# Patient Record
Sex: FEMALE | Race: WHITE | NOT HISPANIC OR LATINO | Employment: FULL TIME | URBAN - METROPOLITAN AREA
[De-identification: names, ages, dates, MRNs, and addresses within clinical notes are randomized per-mention and may not be internally consistent; named-entity substitution may affect disease eponyms.]

---

## 2017-08-25 ENCOUNTER — COMMUNICATION - HEALTHEAST (OUTPATIENT)
Dept: SCHEDULING | Facility: CLINIC | Age: 22
End: 2017-08-25

## 2017-10-26 ENCOUNTER — OFFICE VISIT - HEALTHEAST (OUTPATIENT)
Dept: FAMILY MEDICINE | Facility: CLINIC | Age: 22
End: 2017-10-26

## 2017-10-26 DIAGNOSIS — Z23 NEED FOR PROPHYLACTIC VACCINATION WITH TETANUS-DIPHTHERIA (TD): ICD-10-CM

## 2017-10-26 DIAGNOSIS — L98.8 SKIN MACULE: ICD-10-CM

## 2017-10-26 ASSESSMENT — MIFFLIN-ST. JEOR: SCORE: 1432.15

## 2017-12-12 ENCOUNTER — AMBULATORY - HEALTHEAST (OUTPATIENT)
Dept: FAMILY MEDICINE | Facility: CLINIC | Age: 22
End: 2017-12-12

## 2017-12-21 ENCOUNTER — RECORDS - HEALTHEAST (OUTPATIENT)
Dept: ADMINISTRATIVE | Facility: OTHER | Age: 22
End: 2017-12-21

## 2017-12-27 ENCOUNTER — RECORDS - HEALTHEAST (OUTPATIENT)
Dept: ADMINISTRATIVE | Facility: OTHER | Age: 22
End: 2017-12-27

## 2017-12-27 LAB
LAB AP CHARGES (HE HISTORICAL CONVERSION): NORMAL
PATH REPORT.COMMENTS IMP SPEC: NORMAL
PATH REPORT.COMMENTS IMP SPEC: NORMAL
PATH REPORT.FINAL DX SPEC: NORMAL
PATH REPORT.GROSS SPEC: NORMAL
PATH REPORT.MICROSCOPIC SPEC OTHER STN: NORMAL
PATH REPORT.RELEVANT HX SPEC: NORMAL
RESULT FLAG (HE HISTORICAL CONVERSION): NORMAL

## 2018-03-29 ASSESSMENT — MIFFLIN-ST. JEOR: SCORE: 1432.15

## 2018-03-30 ENCOUNTER — ANESTHESIA - HEALTHEAST (OUTPATIENT)
Dept: SURGERY | Facility: CLINIC | Age: 23
End: 2018-03-30

## 2018-03-30 ENCOUNTER — SURGERY - HEALTHEAST (OUTPATIENT)
Dept: SURGERY | Facility: CLINIC | Age: 23
End: 2018-03-30

## 2018-03-30 ASSESSMENT — MIFFLIN-ST. JEOR: SCORE: 1436.23

## 2018-05-17 ENCOUNTER — OFFICE VISIT - HEALTHEAST (OUTPATIENT)
Dept: ALLERGY | Facility: CLINIC | Age: 23
End: 2018-05-17

## 2018-05-17 DIAGNOSIS — J45.30 MILD PERSISTENT ASTHMA WITHOUT COMPLICATION: ICD-10-CM

## 2018-05-17 DIAGNOSIS — J30.9 CHRONIC ALLERGIC RHINITIS, UNSPECIFIED SEASONALITY, UNSPECIFIED TRIGGER: ICD-10-CM

## 2018-05-17 ASSESSMENT — MIFFLIN-ST. JEOR: SCORE: 1432.15

## 2018-11-15 ENCOUNTER — OFFICE VISIT - HEALTHEAST (OUTPATIENT)
Dept: ALLERGY | Facility: CLINIC | Age: 23
End: 2018-11-15

## 2018-11-15 DIAGNOSIS — J30.9 ALLERGIC RHINITIS, UNSPECIFIED SEASONALITY, UNSPECIFIED TRIGGER: ICD-10-CM

## 2018-11-15 DIAGNOSIS — J45.30 MILD PERSISTENT ASTHMA WITHOUT COMPLICATION: ICD-10-CM

## 2018-11-15 ASSESSMENT — MIFFLIN-ST. JEOR: SCORE: 1432.15

## 2019-01-14 ENCOUNTER — COMMUNICATION - HEALTHEAST (OUTPATIENT)
Dept: ALLERGY | Facility: CLINIC | Age: 24
End: 2019-01-14

## 2019-01-14 DIAGNOSIS — J45.30 MILD PERSISTENT ASTHMA WITHOUT COMPLICATION: ICD-10-CM

## 2019-01-30 ENCOUNTER — COMMUNICATION - HEALTHEAST (OUTPATIENT)
Dept: ALLERGY | Facility: CLINIC | Age: 24
End: 2019-01-30

## 2019-04-02 ENCOUNTER — OFFICE VISIT - HEALTHEAST (OUTPATIENT)
Dept: FAMILY MEDICINE | Facility: CLINIC | Age: 24
End: 2019-04-02

## 2019-04-02 DIAGNOSIS — Z33.1 CURRENT PREGNANCY DETERMINED BY HISTORY: ICD-10-CM

## 2019-04-02 DIAGNOSIS — Z86.2 HISTORY OF BLOOD DISORDER: ICD-10-CM

## 2019-04-03 ENCOUNTER — AMBULATORY - HEALTHEAST (OUTPATIENT)
Dept: MATERNAL FETAL MEDICINE | Facility: HOSPITAL | Age: 24
End: 2019-04-03

## 2019-04-03 DIAGNOSIS — O26.90 PREGNANCY, ANTEPARTUM, COMPLICATIONS: ICD-10-CM

## 2019-04-05 ENCOUNTER — OFFICE VISIT - HEALTHEAST (OUTPATIENT)
Dept: MATERNAL FETAL MEDICINE | Facility: HOSPITAL | Age: 24
End: 2019-04-05

## 2019-04-05 DIAGNOSIS — Z84.81 FAMILY HISTORY OF GENETIC DISEASE CARRIER: ICD-10-CM

## 2019-04-05 DIAGNOSIS — O26.90 PREGNANCY, ANTEPARTUM, COMPLICATIONS: ICD-10-CM

## 2019-04-05 DIAGNOSIS — Z83.2 FAMILY HISTORY OF BLEEDING OR CLOTTING DISORDER: ICD-10-CM

## 2019-04-09 ENCOUNTER — COMMUNICATION - HEALTHEAST (OUTPATIENT)
Dept: MATERNAL FETAL MEDICINE | Facility: HOSPITAL | Age: 24
End: 2019-04-09

## 2019-04-11 DIAGNOSIS — Z83.2 FAMILY HISTORY OF BLEEDING OR CLOTTING DISORDER: Primary | ICD-10-CM

## 2019-05-06 ENCOUNTER — OFFICE VISIT (OUTPATIENT)
Dept: HEMATOLOGY | Facility: CLINIC | Age: 24
End: 2019-05-06
Attending: INTERNAL MEDICINE
Payer: COMMERCIAL

## 2019-05-06 ENCOUNTER — RECORDS - HEALTHEAST (OUTPATIENT)
Dept: ADMINISTRATIVE | Facility: OTHER | Age: 24
End: 2019-05-06

## 2019-05-06 VITALS
DIASTOLIC BLOOD PRESSURE: 73 MMHG | HEART RATE: 87 BPM | HEIGHT: 65 IN | SYSTOLIC BLOOD PRESSURE: 119 MMHG | RESPIRATION RATE: 12 BRPM | WEIGHT: 164 LBS | OXYGEN SATURATION: 97 % | TEMPERATURE: 98.2 F | BODY MASS INDEX: 27.32 KG/M2

## 2019-05-06 DIAGNOSIS — Z83.2 FAMILY HISTORY OF BLEEDING DISORDER: Primary | ICD-10-CM

## 2019-05-06 LAB
BASOPHILS # BLD AUTO: 0 10E9/L (ref 0–0.2)
BASOPHILS NFR BLD AUTO: 0.4 %
DIFFERENTIAL METHOD BLD: NORMAL
EOSINOPHIL # BLD AUTO: 0.6 10E9/L (ref 0–0.7)
EOSINOPHIL NFR BLD AUTO: 5.5 %
ERYTHROCYTE [DISTWIDTH] IN BLOOD BY AUTOMATED COUNT: 12.3 % (ref 10–15)
HCT VFR BLD AUTO: 35.2 % (ref 35–47)
HGB BLD-MCNC: 12.2 G/DL (ref 11.7–15.7)
IMM GRANULOCYTES # BLD: 0.2 10E9/L (ref 0–0.4)
IMM GRANULOCYTES NFR BLD: 1.4 %
LYMPHOCYTES # BLD AUTO: 1.9 10E9/L (ref 0.8–5.3)
LYMPHOCYTES NFR BLD AUTO: 17.8 %
MCH RBC QN AUTO: 30.7 PG (ref 26.5–33)
MCHC RBC AUTO-ENTMCNC: 34.7 G/DL (ref 31.5–36.5)
MCV RBC AUTO: 88 FL (ref 78–100)
MONOCYTES # BLD AUTO: 0.7 10E9/L (ref 0–1.3)
MONOCYTES NFR BLD AUTO: 6.8 %
NEUTROPHILS # BLD AUTO: 7.3 10E9/L (ref 1.6–8.3)
NEUTROPHILS NFR BLD AUTO: 68.1 %
NRBC # BLD AUTO: 0 10*3/UL
NRBC BLD AUTO-RTO: 0 /100
PLATELET # BLD AUTO: 219 10E9/L (ref 150–450)
RBC # BLD AUTO: 3.98 10E12/L (ref 3.8–5.2)
RETICS # AUTO: 84 10E9/L (ref 25–95)
RETICS/RBC NFR AUTO: 2.1 % (ref 0.5–2)
WBC # BLD AUTO: 10.6 10E9/L (ref 4–11)

## 2019-05-06 PROCEDURE — 99204 OFFICE O/P NEW MOD 45 MIN: CPT | Performed by: INTERNAL MEDICINE

## 2019-05-06 PROCEDURE — G0463 HOSPITAL OUTPT CLINIC VISIT: HCPCS

## 2019-05-06 PROCEDURE — 85025 COMPLETE CBC W/AUTO DIFF WBC: CPT | Performed by: INTERNAL MEDICINE

## 2019-05-06 PROCEDURE — 40000611 ZZHCL STATISTIC MORPHOLOGY W/INTERP HEMEPATH TC 85060: Performed by: INTERNAL MEDICINE

## 2019-05-06 PROCEDURE — 40000072 ZZH STATISTIC GENETIC COUNSELING, < 16 MIN

## 2019-05-06 PROCEDURE — 85045 AUTOMATED RETICULOCYTE COUNT: CPT | Performed by: INTERNAL MEDICINE

## 2019-05-06 RX ORDER — LORATADINE 10 MG/1
10 TABLET ORAL DAILY
COMMUNITY

## 2019-05-06 RX ORDER — BUDESONIDE AND FORMOTEROL FUMARATE DIHYDRATE 160; 4.5 UG/1; UG/1
2 AEROSOL RESPIRATORY (INHALATION) 2 TIMES DAILY
COMMUNITY

## 2019-05-06 RX ORDER — ACETAMINOPHEN 325 MG/1
325 TABLET ORAL EVERY 6 HOURS PRN
COMMUNITY

## 2019-05-06 RX ORDER — ALBUTEROL SULFATE 90 UG/1
2 AEROSOL, METERED RESPIRATORY (INHALATION) EVERY 6 HOURS
COMMUNITY

## 2019-05-06 ASSESSMENT — MIFFLIN-ST. JEOR: SCORE: 1489.78

## 2019-05-06 ASSESSMENT — PAIN SCALES - GENERAL: PAINLEVEL: NO PAIN (0)

## 2019-05-06 NOTE — NURSING NOTE
Wilma Esteban  MRN: [id number]  23 year old, 1995  Sister with Bernard Soulier (platelet defect); bleeding post-partum x 2      Saw Wilma Esteban with Dr. Perez today.  Also present was her  Collin and her 4 year old daughter.  She does NOT have a bleeding history with some bruising during this pregnancy, but none at present.  She is due 7/19/19. She had 1 normal delivery without bleeding and 2 miscarriages with D&Cs without bleeding issues.  No other surgeries/procedures. Periods prior to pregnancy 4 days and normal flow.    Dr. Perez has ordered a CBC/periph smear.  Will call when results available.  Daphnie Orlando, RN, MSN -Nurse Clinician, [facility] [phone]

## 2019-05-06 NOTE — LETTER
5/6/2019      RE: Wilma Esteban  [address]  [place] [address]       5/6/2019    Presenting Information: Wilma Esteban was seen for an initial evaluation at the [facility] today. I met with her per the request of Dr. Reina Perez to obtain a family history and to discuss the genetics of Pramod-Soulier syndrome (BSS).     Personal History:   Briefly, Wilma is a 23 year old woman with a family history of BSS. Wilma is currently pregnant, and is due in July. She recently saw Jordy Escamilla MS, [facility] for prenatal genetic counseling; see his note for more details. In brief, Wilma recently learned that her sister was diagnosed with BSS due to her history of post-partum hemorrhage. Wilma does not report any post-partum hemorrhaging with her daughter or with her two miscarriages. She Please see Dr. Perez's note for additional details regarding her personal history.     Family History: A pedigree was obtained by Jordy during her prenatal appointment; please see his note for details. This pedigree was updated specific to bleeding today and scanned into the EMR. The following information is significant:     Sister, age 28 was recently diagnosed with BSS. She had post-partum hemorrhaging with all three pregnancies. After this occurred in her last pregnancy, she had platelet studies done that were consistent with BSS. To Wilma's knowledge, no genetic testing was performed.      No other family history of bleeding reported.    Discussion:     Wilma had reviewed the genetics and autosomal recessive nature of BSS with Jordy at her last appointment, and verbalized understanding of that information. We reviewed again that she has a 25% chance of being affected, assuming both of her parents are carriers. She could also be a carrier, or be unaffected and not a carrier. Dr. Perez will discuss necessary platelet testing with Wilma that can help make the  diagnosis of BSS.     Three genes are known to cause BSS. If she is found not to be affected with BSS, she could choose to pursue genetic carrier testing to determine risk to children. Genetic testing in her sister first would be most helpful, as this could identify the causative gene and specific mutations, and then allow for more targeted carrier testing for Wilma and other relatives. If her sister declines testing, or if Wilma would like to pursue this herself, carrier testing could be performed for Wilma, but each known gene for BSS would need to be analyzed instead of specific known mutations instead. There are some limitations to this type of testing, as we would not be able to test for familial, previously identified mutations.    We did review that even if she was a carrier, there is a low chance that her partner is also a carrier, and therefore risk to her children and future pregnancies to have BSS is very low. We discussed that the incidence of BSS is approximately one in one million.     Wilma expressed understanding of the information we discussed. She had no further questions for me today.     Plan:   1. A three generation pedigree was updated and scanned into the EMR.  2. Wilma will meet with Dr. Perez today to discuss laboratory testing for BSS. I can meet with Wilma in the future if genetic testing is needed.     Approximate Time Spent in Consultation: 10 minutes.     Kelli Bhatti MS, [facility]  Licensed Genetic Counselor  P. [phone]  F. [phone]        Kelli Bhatti, ISHA

## 2019-05-06 NOTE — PROGRESS NOTES
[facility] outpatient consult    Reason for Consult: Family history of Pramod Reyeser; pregnant    History of Present Illness:  Ms. Esteban is a 24 yo woman with a sister who was recently diagnosed with Pramod Soulier syndrome. She has been seen by a genetic counselor.  She has had 3 prior pregnancies, one went to term without any complications 1/9/15.  She had 2 miscarriages, both were in the first trimester and required D&C, neither of which were associated with any bleeding issues.  With this pregnancy at about 9 weeks gestation (18), she had some vaginal bleeding and was noted to have subchorionic hemorrhage on ultrasound.  No bleeding issues since that time.  She is due .  She denies any epistaxis.  She has occasional gum bleeding when she brushes her teeth.  No melena, hematochezia, easy bruising.  When not pregnant, she has not had heavy menstrual periods.  She reports that as a child when she would play with her sister that her sister always had a lot of bruises and she never did.    Past medical history:    miscarriage 2  Asthma since childhood- no ED visits since she was a very young child.  No surgical procedures, including no wisdom tooth extraction.    Medications:  Prenatal vitamin 1 daily  Albuterol inhaler 2 puffs every 6 hours as needed  Symbicort 2 puffs twice daily  Claritin 10 mg daily as needed  No other supplements.    Social: She is , accompanied by her  today.  She works as a  for interpreters in health care clinic.  She is not drinking any alcohol now, prior to pregnancy occasionally on weekends.  She quit smoking 5 years ago.    Family history: The sister has Pramod Soulier, no other bleeding in the family that she is aware of.  Brother possibly has a Erler's Danlos syndrome.    Review of Systems:  As in history above.  She has some fatigue and had some nausea earlier in pregnancy.  The rest of the >10 point ROS is  "negative.    PHYSICAL EXAMINATION:  /73 (BP Location: Right arm, Patient Position: Sitting, Cuff Size: Adult Regular)   Pulse 87   Temp 98.2  F (36.8  C) (Oral)   Resp 12   Ht 1.651 m (5' 5\")   Wt 74.4 kg (164 lb)   SpO2 97%   BMI 27.29 kg/m      General appearance:  Patient is 24 yo woman in no acute distress.     HEENT:  No pallor, icterus, or mucositis.  No thyromegaly.   Lungs:  Clear to auscultation bilaterally. No wheezes.  Heart:  Regular rate and rhythm; no S3 S4 or murmer.     Abdomen:  Obviously pregnant. Positive bowel sounds, soft and nontender, nondistended.  No hepatomegaly. No splenomegaly appreciated.    Extremities:  No joint swelling or tenderness.  No ankle edema.     Skin:  No rash, no petechiae or ecchymoses.    Labs:  12/14/18 at [facility]  WBC 9.4 4.0 - 11.0 thou/uL   RBC 4.13 3.80 - 5.40 mill/uL   Hemoglobin 12.5 12.0 - 16.0 g/dL   Hematocrit 35.5 35.0 - 47.0 %   MCV 86 80 - 100 fL   MCH 30.3 27.0 - 34.0 pg   MCHC 35.2 32.0 - 36.0 g/dL   RDW 11.5 11.0 - 14.5 %   Platelets 275 140 - 440 thou/uL   MPV 9.2 8.5 - 12.5 fL     Labs today pending.    Assessment and recommendation: This is a 23-year-old woman with a sister who has Pramod-Soulier syndrome (BSS).  This is an autosomal recessive disorder of platelets, with a defect in glycoprotein Ib/V/IX.  There is usually a mildly decreased platelet count and the platelets are large.  They have decreased aggregation with ristocetin.  Patients with BSS will have easy bruising and bleeding with surgical procedures and childbirth.  Wilma has not had any problems with bleeding with her previous delivery.  She does not have easy bruising or other bleeding symptoms.  Her labs in December 2018 show a normal platelet count with a normal mean platelet volume.  I am sending a repeat CBC with pathology reviewed just to make sure there are no giant platelets.  I think it is unlikely that she has PSS and there is no reason to do platelet " aggregation studies or analysis of platelet surface glycoproteins.  I reassured her that BSS is extremely rare, so even if she is heterozygous for IBS as abnormality, it is unlikely that her  also has a BSS abnormality that could be passed on to her child.    She does not need routine follow-up in hematology clinic.    Reina Perez MD  Hematology    Addendum:  Blood smear and platelet count are normal. That combined with no significant abnormal bleeding history, she does not have Pramod Soulier Syndrome. No follow up in hematology clinic is needed.   Reina Perez MD  Hematology  Results for SOHEILA BENITEZ (MRN [id number]) as of 5/10/2019 15:13   Ref. Range 5/6/2019 15:00   WBC Latest Ref Range: 4.0 - 11.0 10e9/L 10.6   Hemoglobin Latest Ref Range: 11.7 - 15.7 g/dL 12.2   Hematocrit Latest Ref Range: 35.0 - 47.0 % 35.2   Platelet Count Latest Ref Range: 150 - 450 10e9/L 219   RBC Count Latest Ref Range: 3.8 - 5.2 10e12/L 3.98   MCV Latest Ref Range: 78 - 100 fl 88   MCH Latest Ref Range: 26.5 - 33.0 pg 30.7   MCHC Latest Ref Range: 31.5 - 36.5 g/dL 34.7   RDW Latest Ref Range: 10.0 - 15.0 % 12.3   Diff Method Unknown Automated Method   % Neutrophils Latest Units: % 68.1   % Lymphocytes Latest Units: % 17.8   % Monocytes Latest Units: % 6.8   % Eosinophils Latest Units: % 5.5   % Basophils Latest Units: % 0.4   % Immature Granulocytes Latest Units: % 1.4   Nucleated RBCs Latest Ref Range: 0 /100 0   Absolute Neutrophil Latest Ref Range: 1.6 - 8.3 10e9/L 7.3   Absolute Lymphocytes Latest Ref Range: 0.8 - 5.3 10e9/L 1.9   Absolute Monocytes Latest Ref Range: 0.0 - 1.3 10e9/L 0.7   Absolute Eosinophils Latest Ref Range: 0.0 - 0.7 10e9/L 0.6   Absolute Basophils Latest Ref Range: 0.0 - 0.2 10e9/L 0.0   Abs Immature Granulocytes Latest Ref Range: 0 - 0.4 10e9/L 0.2   Absolute Nucleated RBC Unknown 0.0   % Retic Latest Ref Range: 0.5 - 2.0 % 2.1 (H)   Absolute Retic Latest Ref Range: 25 - 95 10e9/L 84.0      Jarad Report 05/06/2019  3:03 PM 88   Patient Name: SOHEILA BENITEZ   MR#: [id number]   Specimen #: [id number]   Collected: 5/6/2019   Received: 5/7/2019   Reported: 5/7/2019 16:16   Ordering Phy(s): LIZZIE SAMS     For improved result formatting, select 'View Enhanced Report Format' under    Linked Documents section.     TEST(S):   Blood Smear Morphology     FINAL DIAGNOSIS:   Peripheral Blood Smear:   - Normal hemogram and differential     I have personally reviewed all specimens and/or slides, including the   listed special stains, and used them   with my medical judgment to determine the final diagnosis.     Electronically signed out by:     Naina Zapata M.D.,[facility]     Technical testing/processing performed at [place], [place]     CLINICAL HISTORY:   From [facility] electronic medical record; 23-year-old female with a family   history of Pramod Soulier.  She is   currently pregnant, has had one pregnancy to term and has had 2   miscarriages.  Peripheral smear review   requested to assess for giant platelets.     CLINICAL LAB RESULTS:   Battery Order No. Lab Test Code Clinical Result Ref. Range Units Result   Date   Hemogram/Diff/PLT [id number] BR WBC Count 10.6 4.0-11.0 10e9/L 5/6/2019 15:20        RBC Count 3.98 3.8-5.2 10e12/L 5/6/2019 15:20        Hemoglobin 12.2 11.7-15.7 g/dL 5/6/2019 15:20        Hematocrit 35.2 35.0-47.0 % 5/6/2019 15:20        MCV 88  fl 5/6/2019 15:20        MCH 30.7 26.5-33.0 pg 5/6/2019 15:20        MCHC 34.7 31.5-36.5 g/dL 5/6/2019 15:20        RDW 12.3 10.0-15.0 % 5/6/2019 15:20        Platelet Count 219 150-450 10e9/L 5/6/2019 15:20         SEE TEXT   5/6/2019 15:20        Text/Comments:   Automated Method        % Neutrophils 68.1  % 5/6/2019 15:20        % Lymphocytes 17.8  % 5/6/2019 15:20        % Monocytes 6.8  % 5/6/2019 15:20        % Eosinophils 5.5  % 5/6/2019 15:20        % Basophils 0.4  %  5/6/2019 15:20        % Immature Grans 1.4  % 5/6/2019 15:20        Nucleated RBCs 0 0 /100 5/6/2019 15:20        abs Neutrophils 7.3 1.6-8.3 10e9/L 5/6/2019 15:20        abs Lymphocytes 1.9 0.8-5.3 10e9/L 5/6/2019 15:20        abs Monocytes 0.7 0.0-1.3 10e9/L 5/6/2019 15:20        abs Eosinophils 0.6 0.0-0.7 10e9/L 5/6/2019 15:20        abs Basophils 0.0 0.0-0.2 10e9/L 5/6/2019 15:20        abs Imm Granulocytes 0.2 0-0.4 10e9/L 5/6/2019 15:20        abs NRBC 0.0   5/6/2019 15:20     Retic   Retic % H 2.1 0.5-2.0 % 5/6/2019 15:20        Retic abs 84.0 25-95 10e9/L 5/6/2019 15:20     MICROSCOPIC DESCRIPTION:   The red blood cells appear normochromic.  Poikilocytosis is minimal.     Polychromasia is not increased.   Rouleaux formation is not increased.  The morphology of the platelets is   normal.  Rare small platelet clumps   are seen.     CPT Codes:   A: 76429-WNRHX     TESTING LAB LOCATION:   [facility], [address]   [id number] [phone]

## 2019-05-06 NOTE — LETTER
5/6/2019       RE: Wilma Esteban  [address]  [place] [address]     Dear Colleague,    Thank you for referring your patient, Wilma Esteban, to the [facility] at [facility]. Please see a copy of my visit note below.    5/6/2019    Presenting Information: Wilma Esteban was seen for an initial evaluation at the [facility] today. I met with her per the request of Dr. Reina Perez to obtain a family history and to discuss the genetics of Pramod-Soulier syndrome (BSS).     Personal History:   Briefly, Wilma is a 23 year old woman with a family history of BSS. Wilma is currently pregnant, and is due in July. She recently saw Jordy Escamilla MS, [facility] for prenatal genetic counseling; see his note for more details. In brief, Wilma recently learned that her sister was diagnosed with BSS due to her history of post-partum hemorrhage. Wilma does not report any post-partum hemorrhaging with her daughter or with her two miscarriages. She Please see Dr. Perez's note for additional details regarding her personal history.     Family History: A pedigree was obtained by Jordy during her prenatal appointment; please see his note for details. This pedigree was updated specific to bleeding today and scanned into the EMR. The following information is significant:     Sister, age 28 was recently diagnosed with BSS. She had post-partum hemorrhaging with all three pregnancies. After this occurred in her last pregnancy, she had platelet studies done that were consistent with BSS. To Wilma's knowledge, no genetic testing was performed.      No other family history of bleeding reported.    Discussion:     Wilma had reviewed the genetics and autosomal recessive nature of BSS with Jordy at her last appointment, and verbalized understanding of that information. We reviewed again that she has a 25% chance  of being affected, assuming both of her parents are carriers. She could also be a carrier, or be unaffected and not a carrier. Dr. Perez will discuss necessary platelet testing with Wilma that can help make the diagnosis of BSS.     Three genes are known to cause BSS. If she is found not to be affected with BSS, she could choose to pursue genetic carrier testing to determine risk to children. Genetic testing in her sister first would be most helpful, as this could identify the causative gene and specific mutations, and then allow for more targeted carrier testing for Wilma and other relatives. If her sister declines testing, or if Wilma would like to pursue this herself, carrier testing could be performed for Wilma, but each known gene for BSS would need to be analyzed instead of specific known mutations instead. There are some limitations to this type of testing, as we would not be able to test for familial, previously identified mutations.    We did review that even if she was a carrier, there is a low chance that her partner is also a carrier, and therefore risk to her children and future pregnancies to have BSS is very low. We discussed that the incidence of BSS is approximately one in one million.     Wilma expressed understanding of the information we discussed. She had no further questions for me today.     Plan:   1. A three generation pedigree was updated and scanned into the EMR.  2. Wilma will meet with Dr. Perez today to discuss laboratory testing for BSS. I can meet with Wilma in the future if genetic testing is needed.     Approximate Time Spent in Consultation: 10 minutes.     Kelli Bhatti MS, [facility]  Licensed Genetic Counselor  P. [phone]  F. [phone]

## 2019-05-06 NOTE — LETTER
May 10, 2019       TO: Wilma Esteban  [address]  [place] [address]       Dear Ms. Esteban,    I am writing to inform you of your test results. The blood smear and platelet count are normal. That combined with no significant abnormal bleeding history, you do not have Pramod Soulier Syndrome. No follow up in hematology clinic is needed.   Reina Perez MD      Resulted Orders   Reticulocyte Count   Result Value Ref Range    % Retic 2.1 (H) 0.5 - 2.0 %    Absolute Retic 84.0 25 - 95 10e9/L   CBC with platelets differential   Result Value Ref Range    WBC 10.6 4.0 - 11.0 10e9/L    RBC Count 3.98 3.8 - 5.2 10e12/L    Hemoglobin 12.2 11.7 - 15.7 g/dL    Hematocrit 35.2 35.0 - 47.0 %    MCV 88 78 - 100 fl    MCH 30.7 26.5 - 33.0 pg    MCHC 34.7 31.5 - 36.5 g/dL    RDW 12.3 10.0 - 15.0 %    Platelet Count 219 150 - 450 10e9/L    Diff Method Automated Method     % Neutrophils 68.1 %    % Lymphocytes 17.8 %    % Monocytes 6.8 %    % Eosinophils 5.5 %    % Basophils 0.4 %    % Immature Granulocytes 1.4 %    Nucleated RBCs 0 0 /100    Absolute Neutrophil 7.3 1.6 - 8.3 10e9/L    Absolute Lymphocytes 1.9 0.8 - 5.3 10e9/L    Absolute Monocytes 0.7 0.0 - 1.3 10e9/L    Absolute Eosinophils 0.6 0.0 - 0.7 10e9/L    Absolute Basophils 0.0 0.0 - 0.2 10e9/L    Abs Immature Granulocytes 0.2 0 - 0.4 10e9/L    Absolute Nucleated RBC 0.0    Blood Morphology Pathologist Review   Result Value Ref Range    Copath Report       Patient Name: WILMA ESTEBAN  MR#: [id number]  Specimen #: [id number]  Collected: 5/6/2019  Received: 5/7/2019  Reported: 5/7/2019 16:16  Ordering Phy(s): REINA PEREZ    For improved result formatting, select 'View Enhanced Report Format' under   Linked Documents section.    TEST(S):  Blood Smear Morphology    FINAL DIAGNOSIS:  Peripheral Blood Smear:  - Normal hemogram and differential    I have personally reviewed all specimens and/or slides, including the   listed special stains, and  used them  with my medical judgment to determine the final diagnosis.    Electronically signed out by:    Naina Zapata M.D.,[facility]    Technical testing/processing performed at [place], [place]    CLINICAL HISTORY:  From [facility] electronic medical record; 23-year-old female with a family   history of Bernard Soulier.  She is  currently pregnant, has had one pregnancy to term and has had 2   miscarriages.  Peripheral smear review   requested to assess for giant platelets.    CLINICAL LAB RESULTS:  Battery Order No. Lab Test Code Clinical Result Ref. Range Units Result   Date  Hemogram/Diff/PLT [id number] BR WBC Count 10.6 4.0-11.0 10e9/L 5/6/2019 15:20       RBC Count 3.98 3.8-5.2 10e12/L 5/6/2019 15:20       Hemoglobin 12.2 11.7-15.7 g/dL 5/6/2019 15:20       Hematocrit 35.2 35.0-47.0 % 5/6/2019 15:20       MCV 88  fl 5/6/2019 15:20       MCH 30.7 26.5-33.0 pg 5/6/2019 15:20       MCHC 34.7 31.5-36.5 g/dL 5/6/2019 15:20       RDW 12.3 10.0-15.0 % 5/6/2019 15:20       Platelet Count 219 150-450 10e9/L 5/6/2019 15:20        SEE TEXT   5/6/2019 15:20       Text/Comments:  Automated Method       % Neutrophils 68.1  % 5/6/2019 15:20       % Lymphocytes 17.8  % 5/6/2019 15:20       % Monocytes 6.8  % 5/6/2019 15:20       % Eosinophils 5.5  % 5/6/2019 15:20       % Basophils 0.4  % 5/6/2019 15:20       % Immature Grans 1.4  % 5/6/2019 15:20       Nucleated RBCs 0 0 /100 5/6/2019 15:20       abs Neutrophils 7.3 1.6-8. 3 10e9/L 5/6/2019 15:20       abs Lymphocytes 1.9 0.8-5.3 10e9/L 5/6/2019 15:20       abs Monocytes 0.7 0.0-1.3 10e9/L 5/6/2019 15:20       abs Eosinophils 0.6 0.0-0.7 10e9/L 5/6/2019 15:20       abs Basophils 0.0 0.0-0.2 10e9/L 5/6/2019 15:20       abs Imm Granulocytes 0.2 0-0.4 10e9/L 5/6/2019 15:20       abs NRBC 0.0   5/6/2019 15:20    Retic   Retic % H 2.1 0.5-2.0 % 5/6/2019 15:20       Retic abs 84.0 25-95 10e9/L 5/6/2019  15:20    MICROSCOPIC DESCRIPTION:  The red blood cells appear normochromic.  Poikilocytosis is minimal.    Polychromasia is not increased.  Rouleaux formation is not increased.  The morphology of the platelets is   normal.  Rare small platelet clumps  are seen.    CPT Codes:  A: [id number]    TESTING LAB LOCATION:  [facility], [address]   [address]  [phone]    COLLECTION SITE:  Client:  [facility]  Location:  [facility])

## 2019-05-06 NOTE — LETTER
5/6/2019      RE: Wilma Esteban  [address]  [place] [address]     Dear Wilma,    It was a pleasure meeting with you at the [facility] on 5/6/2019.  Here is a copy of the progress note from your recent genetic counseling visit.  If you have any additional questions, please feel free to call.    5/6/2019    Presenting Information: Wilma Esteban was seen for an initial evaluation at the [facility] today. I met with her per the request of Dr. Reina Perez to obtain a family history and to discuss the genetics of Pramod-Soulier syndrome (BSS).     Personal History:   Briefly, Wilma is a 23 year old woman with a family history of BSS. Wilma is currently pregnant, and is due in July. She recently saw Jordy Escamilla MS, [facility] for prenatal genetic counseling; see his note for more details. In brief, Wilma recently learned that her sister was diagnosed with BSS due to her history of post-partum hemorrhage. Wilma does not report any post-partum hemorrhaging with her daughter or with her two miscarriages. She Please see Dr. Perez's note for additional details regarding her personal history.     Family History: A pedigree was obtained by Jordy during her prenatal appointment; please see his note for details. This pedigree was updated specific to bleeding today and scanned into the EMR. The following information is significant:     Sister, age 28 was recently diagnosed with BSS. She had post-partum hemorrhaging with all three pregnancies. After this occurred in her last pregnancy, she had platelet studies done that were consistent with BSS. To Wilma's knowledge, no genetic testing was performed.      No other family history of bleeding reported.    Discussion:     Wilma had reviewed the genetics and autosomal recessive nature of BSS with Jordy at her last appointment, and verbalized understanding of that information.  We reviewed again that she has a 25% chance of being affected, assuming both of her parents are carriers. She could also be a carrier, or be unaffected and not a carrier. Dr. Perez will discuss necessary platelet testing with Wilma that can help make the diagnosis of BSS.     Three genes are known to cause BSS. If she is found not to be affected with BSS, she could choose to pursue genetic carrier testing to determine risk to children. Genetic testing in her sister first would be most helpful, as this could identify the causative gene and specific mutations, and then allow for more targeted carrier testing for Wilma and other relatives. If her sister declines testing, or if Wilma would like to pursue this herself, carrier testing could be performed for Wilma, but each known gene for BSS would need to be analyzed instead of specific known mutations instead. There are some limitations to this type of testing, as we would not be able to test for familial, previously identified mutations.    We did review that even if she was a carrier, there is a low chance that her partner is also a carrier, and therefore risk to her children and future pregnancies to have BSS is very low. We discussed that the incidence of BSS is approximately one in one million.     Wilma expressed understanding of the information we discussed. She had no further questions for me today.     Plan:   1. A three generation pedigree was updated and scanned into the EMR.  2. Wilma will meet with Dr. Perez today to discuss laboratory testing for BSS. I can meet with Wilma in the future if genetic testing is needed.     Approximate Time Spent in Consultation: 10 minutes.     Kelli Bhatti MS, [facility]  Licensed Genetic Counselor  P. [phone]  F. [phone]

## 2019-05-06 NOTE — PROGRESS NOTES
5/6/2019    Presenting Information: Wilma Esteban was seen for an initial evaluation at the [facility] today. I met with her per the request of Dr. Reina Perez to obtain a family history and to discuss the genetics of Pramod-Soulier syndrome (BSS).     Personal History:   Briefly, Wilma is a 23 year old woman with a family history of BSS. Wilma is currently pregnant, and is due in July. She recently saw Jordy Escamilla MS, [facility] for prenatal genetic counseling; see his note for more details. In brief, Wilma recently learned that her sister was diagnosed with BSS due to her history of post-partum hemorrhage. Wilma does not report any post-partum hemorrhaging with her daughter or with her two miscarriages. She Please see Dr. Perez's note for additional details regarding her personal history.     Family History: A pedigree was obtained by Jordy during her prenatal appointment; please see his note for details. This pedigree was updated specific to bleeding today and scanned into the EMR. The following information is significant:     Sister, age 28 was recently diagnosed with BSS. She had post-partum hemorrhaging with all three pregnancies. After this occurred in her last pregnancy, she had platelet studies done that were consistent with BSS. To Wilma's knowledge, no genetic testing was performed.      No other family history of bleeding reported.    Discussion:     Wilma had reviewed the genetics and autosomal recessive nature of BSS with Jordy at her last appointment, and verbalized understanding of that information. We reviewed again that she has a 25% chance of being affected, assuming both of her parents are carriers. She could also be a carrier, or be unaffected and not a carrier. Dr. Perez will discuss necessary platelet testing with Wilma that can help make the diagnosis of BSS.     Three genes are known to cause BSS. If she is found not to be  affected with BSS, she could choose to pursue genetic carrier testing to determine risk to children. Genetic testing in her sister first would be most helpful, as this could identify the causative gene and specific mutations, and then allow for more targeted carrier testing for Wilma and other relatives. If her sister declines testing, or if Wilma would like to pursue this herself, carrier testing could be performed for Wilma, but each known gene for BSS would need to be analyzed instead of specific known mutations instead. There are some limitations to this type of testing, as we would not be able to test for familial, previously identified mutations.    We did review that even if she was a carrier, there is a low chance that her partner is also a carrier, and therefore risk to her children and future pregnancies to have BSS is very low. We discussed that the incidence of BSS is approximately one in one million.     Wilma expressed understanding of the information we discussed. She had no further questions for me today.     Plan:   1. A three generation pedigree was updated and scanned into the EMR.  2. Wilma will meet with Dr. Perez today to discuss laboratory testing for BSS. I can meet with Wilma in the future if genetic testing is needed.     Approximate Time Spent in Consultation: 10 minutes.     Kelli Bhatti MS, [facility]  Licensed Genetic Counselor  P. [phone]  F. [phone]

## 2019-05-07 LAB — COPATH REPORT: NORMAL

## 2019-05-10 ENCOUNTER — TELEPHONE (OUTPATIENT)
Dept: HEMATOLOGY | Facility: CLINIC | Age: 24
End: 2019-05-10

## 2019-05-10 NOTE — TELEPHONE ENCOUNTER
Wilma Benitez  MRN: [id number]  23 year old, 1995  Sister with Pramod Soulier  patient w/o bleeding history, but pregnant & due 7/19/19      Left voicemail on patient's cell phone that lab results were normal and that she terry NOT have Pramod Soulier platelet defect.  I asked that she  call with any questions about the message.    Copath Report 05/06/2019  3:03 PM 88   Patient Name: WILMA BENITEZ   MR#: [id number]   Specimen #: [id number]   Collected: 5/6/2019   Received: 5/7/2019   Reported: 5/7/2019 16:16   Ordering Phy(s): LIZZIE MATT FLAQUITA     TEST(S):   Blood Smear Morphology     FINAL DIAGNOSIS:   Peripheral Blood Smear:   - Normal hemogram and differential      Component      Latest Ref Rng & Units 5/6/2019   WBC      4.0 - 11.0 10e9/L 10.6   RBC Count      3.8 - 5.2 10e12/L 3.98   Hemoglobin      11.7 - 15.7 g/dL 12.2   Hematocrit      35.0 - 47.0 % 35.2   MCV      78 - 100 fl 88   MCH      26.5 - 33.0 pg 30.7   MCHC      31.5 - 36.5 g/dL 34.7   RDW      10.0 - 15.0 % 12.3   Platelet Count      150 - 450 10e9/L 219   Diff Method       Automated Method   % Neutrophils      % 68.1   % Lymphocytes      % 17.8   % Monocytes      % 6.8   % Eosinophils      % 5.5   % Basophils      % 0.4   % Immature Granulocytes      % 1.4   Nucleated RBCs      0 /100 0   Absolute Neutrophil      1.6 - 8.3 10e9/L 7.3   Absolute Lymphocytes      0.8 - 5.3 10e9/L 1.9   Absolute Monocytes      0.0 - 1.3 10e9/L 0.7   Absolute Eosinophils      0.0 - 0.7 10e9/L 0.6   Absolute Basophils      0.0 - 0.2 10e9/L 0.0   Abs Immature Granulocytes      0 - 0.4 10e9/L 0.2   Absolute Nucleated RBC       0.0   % Retic      0.5 - 2.0 % 2.1 (H)   Absolute Retic      25 - 95 10e9/L 84.0   Daphnie Orlando, RN, MSN -Nurse Clinician, [facility] [phone]

## 2019-07-27 ENCOUNTER — ANESTHESIA - HEALTHEAST (OUTPATIENT)
Dept: OBGYN | Facility: CLINIC | Age: 24
End: 2019-07-27

## 2019-07-29 ENCOUNTER — HOME CARE/HOSPICE - HEALTHEAST (OUTPATIENT)
Dept: HOME HEALTH SERVICES | Facility: HOME HEALTH | Age: 24
End: 2019-07-29

## 2019-12-16 ENCOUNTER — COMMUNICATION - HEALTHEAST (OUTPATIENT)
Dept: ALLERGY | Facility: CLINIC | Age: 24
End: 2019-12-16

## 2020-01-11 ENCOUNTER — OFFICE VISIT - HEALTHEAST (OUTPATIENT)
Dept: FAMILY MEDICINE | Facility: CLINIC | Age: 25
End: 2020-01-11

## 2020-01-11 DIAGNOSIS — J02.9 SORE THROAT: ICD-10-CM

## 2020-01-11 LAB
DEPRECATED S PYO AG THROAT QL EIA: NORMAL
MONOCYTES NFR BLD AUTO: NEGATIVE %

## 2020-01-12 LAB — GROUP A STREP BY PCR: NORMAL

## 2020-02-04 ENCOUNTER — AMBULATORY - HEALTHEAST (OUTPATIENT)
Dept: FAMILY MEDICINE | Facility: CLINIC | Age: 25
End: 2020-02-04

## 2020-02-25 ENCOUNTER — OFFICE VISIT - HEALTHEAST (OUTPATIENT)
Dept: FAMILY MEDICINE | Facility: CLINIC | Age: 25
End: 2020-02-25

## 2020-02-25 ENCOUNTER — NURSE TRIAGE (OUTPATIENT)
Dept: NURSING | Facility: CLINIC | Age: 25
End: 2020-02-25

## 2020-02-25 DIAGNOSIS — L50.9 HIVES: ICD-10-CM

## 2020-02-25 NOTE — TELEPHONE ENCOUNTER
Patient calling. States she developed hives approximately 24 hours ago.    Does not have any known exposure to allergens that would cause hives.    Denies shortness of breath or wheezing. States she is getting over a cold and has a lingering cough from that.    Has used aloe (is allergic to benadryl), icing and cool showers to keep itching under control but continues to flare.    Protocol and care advice reviewed  Caller states understanding of the recommended disposition. Will go to [facility] urgent care in [place] for assessment of allergic reaction.    Advised to call back if further questions or concerns      Additional Information    Negative: Difficulty breathing or wheezing now    Negative: Rapid onset of swollen tongue    Negative: Rapid onset of hoarseness or cough    Negative: Very weak (can't stand)    Negative: Difficult to awaken or acting confused (e.g., disoriented, slurred speech)    Negative: Life-threatening reaction (anaphylaxis) in the past to similar substance (e.g., food, insect bite/sting, chemical, etc.) and < 2 hours since exposure    Negative: Sounds like a life-threatening emergency to the triager    Negative: Swollen tongue    Negative: Widespread hives and onset < 2 hours of exposure to high-risk allergen (e.g., peanuts, tree nuts, fish, or shellfish)    Negative: Patient sounds very sick or weak to the triager    MODERATE-SEVERE hives persist (i.e., hives interfere with normal activities or work) and taking antihistamine (e.g., Benadryl, Claritin) > 24 hours    Protocols used: HIVES-A-OH

## 2020-03-11 ENCOUNTER — HEALTH MAINTENANCE LETTER (OUTPATIENT)
Age: 25
End: 2020-03-11

## 2020-03-16 ENCOUNTER — OFFICE VISIT - HEALTHEAST (OUTPATIENT)
Dept: FAMILY MEDICINE | Facility: CLINIC | Age: 25
End: 2020-03-16

## 2020-03-16 DIAGNOSIS — J30.9 ALLERGIC RHINITIS, UNSPECIFIED SEASONALITY, UNSPECIFIED TRIGGER: ICD-10-CM

## 2020-03-16 DIAGNOSIS — T78.40XD ALLERGIC REACTION, SUBSEQUENT ENCOUNTER: ICD-10-CM

## 2020-03-16 DIAGNOSIS — J30.81 CAT ALLERGIES: ICD-10-CM

## 2020-03-16 DIAGNOSIS — J45.30 MILD PERSISTENT ASTHMA WITHOUT COMPLICATION: ICD-10-CM

## 2020-03-16 ASSESSMENT — PATIENT HEALTH QUESTIONNAIRE - PHQ9: SUM OF ALL RESPONSES TO PHQ QUESTIONS 1-9: 9

## 2020-03-16 ASSESSMENT — MIFFLIN-ST. JEOR: SCORE: 1381.97

## 2020-03-17 ENCOUNTER — COMMUNICATION - HEALTHEAST (OUTPATIENT)
Dept: FAMILY MEDICINE | Facility: CLINIC | Age: 25
End: 2020-03-17

## 2020-03-17 DIAGNOSIS — J45.30 MILD PERSISTENT ASTHMA WITHOUT COMPLICATION: ICD-10-CM

## 2020-12-25 ENCOUNTER — HOSPITAL ENCOUNTER (OUTPATIENT)
Dept: MEDSURG UNIT | Facility: CLINIC | Age: 25
Discharge: HOME | End: 2020-12-25
Attending: STUDENT IN AN ORGANIZED HEALTH CARE EDUCATION/TRAINING PROGRAM | Admitting: STUDENT IN AN ORGANIZED HEALTH CARE EDUCATION/TRAINING PROGRAM

## 2020-12-25 ASSESSMENT — MIFFLIN-ST. JEOR: SCORE: 1479.78

## 2021-01-03 ENCOUNTER — HEALTH MAINTENANCE LETTER (OUTPATIENT)
Age: 26
End: 2021-01-03

## 2021-04-25 ENCOUNTER — HEALTH MAINTENANCE LETTER (OUTPATIENT)
Age: 26
End: 2021-04-25

## 2021-05-27 ASSESSMENT — PATIENT HEALTH QUESTIONNAIRE - PHQ9: SUM OF ALL RESPONSES TO PHQ QUESTIONS 1-9: 9

## 2021-05-27 NOTE — TELEPHONE ENCOUNTER
4/9/2019    Called patient to discuss follow up from her genetic counseling appointment 4/5/19.  Wilma is interested in pursuing an evaluation for Pramod Soulier Syndrome, and [facility] will be placed to help facilitate this care.  This information was left in Wilma's voicemail as requested at her genetic counseling appointment and Wilma was encouraged to contact me with any questions or concerns.     Jordy Escamilla MS, [facility]  Licensed Genetic Counselor  Phone: [phone]  Pager: [phone]

## 2021-05-27 NOTE — PROGRESS NOTES
[facility]  Genetic Counseling Consult    Patient:  Wilma Esteban YOB: 1995   Date of Service:  2019      Wilma Esteban was seen at the [facility] for genetic consultation to discuss her family history of Pramod Soulier Syndrome.        Impression/Plan:   1. Wilma reports that she was contacted by her sister and informed that her sister was recently diagnosed with Pramod Soulier Syndrome.  This is an autosomal recessive genetic clotting disorder with increased risk for post-partum hemorrhage for affected individuals.  This family history would put Wilma at a 25% risk for being affected herself, and she is interested in clarifying this risk as she is currently pregnant.      2. Genetic counseling will work with Wilma's primary clinic and [facility] to determine appropriate evaluations and coordinate care as needed.  Wilma will be contacted to discuss recommendations moving forward.  Wilma requested that she be contacted at [phone], and requests that detailed messages be left in her voicemail if she cannot be reached.      Pregnancy History:   /Parity:    Age at Delivery: 23 y.o.  HUA: 2019, by Other Basis  Gestational Age: 25w0d    No significant complications or exposures were reported in the current pregnancy.    Wilma s pregnancy history is significant for 1 prior full term pregnancy with no reported obstetric complications and 2 prior miscarriages with no known cause.    Medical History:   Wilma s reported medical history is not expected to impact pregnancy management or risks to fetal development.       Family History:   A three-generation pedigree was obtained, and is scanned under the  Media  tab.   The following significant findings were reported by Wilma:    Wilma reports that her sister was recently diagnosed with  Pramod-Soulier Syndrome (BSS).  BSS is a rare platelet disorder that is typically inherited in an autosomal recessive manner.  There are several genes associated with the condition, and affected individuals have genetic mutations in both copies of one of the associated genes, leading to the disorder.  If an individual has only 1 copy of a mutation, and 1 working copy of the gene, they are what is referred to as a carrier.  Being a carrier typically has no signs or symptoms.  If both parents are carriers for the same condition, there is a 25% chance for each child they have to be affected with the condition.  We discussed that based on the family history, we would assume that both of Wilma's parents are carriers for the condition.  This means that there is a 25% chance for Wilma to be affected with the condition.  Records for Wilma's sister were not available for review.  Wilma reports that she is estranged from her family, and that it was surprising to hear from her sister at all.  Per report, Wilma's sister has had 3 pregnancies, all complicated by post-partum hemorrhage, which led to her evaluation and eventual diagnosis with BSS.  Wilma's motivation for pursuing testing is to insure adequate planning for her delivery in July.      In affected individuals, platelets are unusually large and fewer in number than usual. People with Pramod-Soulier syndrome tend to bruise easily and have an increased risk of nosebleeds, and may experience bleeding of the gums as well.  Wilma reports that during pregnancy she has been experiencing significant bruising and has had bleeding of her gums when brushing her teeth as well.  We discussed that these symptoms can present as symptoms of pregnancy, but that given the family history, further evaluation may be warranted.    Wilma completed a request of records from her primary OB clinic, and asked that we begin coordinating appropriate testing to evaluate for BSS.   We discussed that initial testing would likely involve clotting workup studies, as these results would be able to provide information more quickly than genetic testing for BSS.  Wilma will be contacted by genetic counseling to discuss coordination of appropriate testing moving forward.     Wilma reports that her brother was diagnosed with adonis danlos syndrome at birth, with no further complications or evaluations known.  This topic was not discussed in detail today as Wilma has limited health history information for her brother.  EDS is a genetic condition and Wilma may also be at risk for this condition if her brother is affected.  A diagnosis at birth would be atypical for EDS, and without records for this individual to review it is difficult to assess what impact this history may have for Wilma.    Otherwise, the reported family history is negative for multiple miscarriages, stillbirths, birth defects, cognitive impairment, known genetic conditions, and consanguinity.         Risk Assessment for Chromosome Conditions:   We explained that the risk for fetal chromosome abnormalities increases with maternal age. We discussed specific features of common chromosome abnormalities, including Down syndrome, trisomy 13, trisomy 18, and sex chromosome trisomies.      Wilma has declined aneuploidy screening in this pregnancy and this topic was not discussed in detail today.          It was a pleasure to be involved with Wilma s care. Face-to-face time of the meeting was 30 minutes.    Jordy Escamilla MS, [facility]  Licensed Genetic Counselor  Phone: [phone]  Pager: [phone]

## 2021-05-27 NOTE — PROGRESS NOTES
Subjective:      Wilma Esteban is a 23 y.o. female who is 5 months pregnant, who presents for evaluation of family history of blood disorder.  Patient is 5 months pregnant, following with OB/GYN.  She reports no significant concerns for pregnancy thus far.  She has 1 daughter.  She has had 2 miscarriages in the past, both requiring D&C's.  She has noticed during this pregnancy that she seems to have easy bruising on her skin and bleeding when she brushes her teeth.  She says that her OB doctor has said that is normal for pregnancy and is not concerned.  Today she received a call from her sister stating that her sister tested positive for Pramod Soulier Syndrome.  Patient says she has not spoken to her sister in 4-5 years, so this call was apparently important.  Patient was very concerned that this may have an impact on her current pregnancy, perhaps future pregnancies.  She has a normal scheduled OB appointment with Dr. Stephens in about 4 weeks.  She would like to have this addressed before then if possible.    Patient Active Problem List   Diagnosis     Amenorrhea     Intermittent asthma     Anxiety     Allergic rhinitis     Contraception - IUD     Skin macule       Current Outpatient Medications:      prenatal vitamin iron-folic acid 27mg-0.8mg (PRENATAL S) 27 mg iron- 800 mcg Tab tablet, Take 1 tablet by mouth daily., Disp: , Rfl:      acetaminophen (TYLENOL) 500 MG tablet, Take 500 mg by mouth every 6 (six) hours as needed for pain., Disp: , Rfl:      albuterol (PROAIR HFA) 90 mcg/actuation inhaler, Inhale 2 puffs every 4 (four) hours as needed for wheezing or shortness of breath., Disp: 1 Inhaler, Rfl: 1     budesonide-formoterol (SYMBICORT) 160-4.5 mcg/actuation inhaler, Inhale 2 puffs 2 (two) times a day., Disp: 1 Inhaler, Rfl: 3     loratadine (CLARITIN) 10 mg tablet, Take 10 mg by mouth daily as needed for allergies. , Disp: , Rfl:      NORCO 5-325 mg per tablet, Take 1 tablet by mouth every 4 (four)  hours as needed for pain., Disp: 20 tablet, Rfl: 0     Objective:     Allergies:  Red dye    Vitals:  Vitals:    04/02/19 1504   BP: 110/58   Pulse: 80   Resp: 16     Body mass index is 25.75 kg/m .    Vital signs reviewed.  General: Patient is alert and oriented x 3, in no apparent distress  Fetal exam: Fetal heart tones heard at approximately 150 bpm, patient reports positive fetal movement    Results for orders placed or performed during the hospital encounter of 12/14/18   INR   Result Value Ref Range    INR 1.08 0.90 - 1.10   APTT(PTT)   Result Value Ref Range    PTT 32 24 - 37 seconds   Type and Screen   Result Value Ref Range    ABORh A POS     Antibody Screen Negative Negative   Beta-hCG, Quantitative   Result Value Ref Range    Beta-hCG, Quantitative >225,000 (H) 0 - 4 mlU/mL   HM1 (CBC with Diff)   Result Value Ref Range    WBC 9.4 4.0 - 11.0 thou/uL    RBC 4.13 3.80 - 5.40 mill/uL    Hemoglobin 12.5 12.0 - 16.0 g/dL    Hematocrit 35.5 35.0 - 47.0 %    MCV 86 80 - 100 fL    MCH 30.3 27.0 - 34.0 pg    MCHC 35.2 32.0 - 36.0 g/dL    RDW 11.5 11.0 - 14.5 %    Platelets 275 140 - 440 thou/uL    MPV 9.2 8.5 - 12.5 fL    Neutrophils % 65 50 - 70 %    Lymphocytes % 23 20 - 40 %    Monocytes % 7 2 - 10 %    Eosinophils % 5 0 - 6 %    Basophils % 1 0 - 2 %    Neutrophils Absolute 6.1 2.0 - 7.7 thou/uL    Lymphocytes Absolute 2.2 0.8 - 4.4 thou/uL    Monocytes Absolute 0.6 0.0 - 0.9 thou/uL    Eosinophils Absolute 0.5 (H) 0.0 - 0.4 thou/uL    Basophils Absolute 0.1 0.0 - 0.2 thou/uL       Assessment and Plan:   1. Family History of Pramod Soulier Syndrome.  Patient's sister tested positive for this blood disorder.  Patient is currently 5 months pregnant.  She would like to be evaluated for this.  She has noticed during this pregnancy that she has seemed to bruise easily on her skin and has had bleeding from her gums when she brushes her teeth.  1 successful previous pregnancy.  2 previous miscarriages.  No current  concerns or complications for this current pregnancy.  She is following with Dr. Stephens at [facility].  Referral placed today to M[facility] Fetal Medicine for further evaluation and possible testing for this syndrome.    This dictation uses voice recognition software, which may contain typographical errors.

## 2021-05-30 NOTE — ANESTHESIA PREPROCEDURE EVALUATION
Anesthesia Evaluation        Airway    Pulmonary                           Cardiovascular    Neuro/Psych      Endo/Other    (+) pregnant     GI/Hepatic/Renal            Dental              Patient requesting labor epidural, chart reviewed.  Discussed risks including hypotension, nerve damage, infection, and post dural puncture headache.  Patient understands, questions answered, and agrees to proceed.  Time out instituted prior to placement. Hands washed, sterile gloves and mask worn.               Anesthesia Plan  Planned anesthetic: epidural    ASA 2     Anesthetic plan and risks discussed with: patient

## 2021-05-30 NOTE — ANESTHESIA POSTPROCEDURE EVALUATION
Patient: Wilma Esteban   * No procedures listed *  Anesthesia type: epidural    Patient location: Labor and Delivery  Last vitals: No vitals data found for the desired time range.    Post vital signs: stable  Level of consciousness: awake and responds to simple questions  Post-anesthesia pain: pain controlled  Post-anesthesia nausea and vomiting: no  Pulmonary: unassisted, return to baseline  Cardiovascular: stable and blood pressure at baseline  Hydration: adequate  Anesthetic events: no    QCDR Measures:  ASA# 11 - Shaila-op Cardiac Arrest: ASA11B - Patient did NOT experience unanticipated cardiac arrest  ASA# 12 - Shaila-op Mortality Rate: ASA12B - Patient did NOT die  ASA# 13 - PACU Re-Intubation Rate: NA - No ETT / LMA used for case  ASA# 10 - Composite Anes Safety: ASA10A - No serious adverse event    Additional Notes:

## 2021-05-30 NOTE — ANESTHESIA PROCEDURE NOTES
Epidural Block    Patient location during procedure: OB  Time Called: 7/27/2019 8:05 AM  Reason for Block:labor epidural  Staffing:  Performing  Anesthesiologist: Maverick Moreno MD  Preanesthetic Checklist  Completed: patient identified, risks, benefits, and alternatives discussed, timeout performed, consent obtained, at patient's request, airway assessed, oxygen available, suction available, emergency drugs available and hand hygiene performed  Procedure  Patient position: sitting  Prep: ChloraPrep  Patient monitoring: blood pressure, heart rate and continuous pulse oximetry  Approach: midline  Location: L3-L4  Injection technique: VELIA saline (PFNS)  Number of Attempts:1  Needle  Needle type: Socket Mobilejudd   Needle gauge: 18 G     Catheter in Space: 4      Additional Notes:  Negative CSF, heme, paresthesia

## 2021-05-31 VITALS
BODY MASS INDEX: 24.21 KG/M2 | BODY MASS INDEX: 24.21 KG/M2 | BODY MASS INDEX: 24.21 KG/M2 | HEIGHT: 66 IN | HEIGHT: 66 IN | BODY MASS INDEX: 24.21 KG/M2 | WEIGHT: 150 LBS | WEIGHT: 150 LBS

## 2021-05-31 VITALS — HEIGHT: 66 IN | BODY MASS INDEX: 24.11 KG/M2 | WEIGHT: 150 LBS

## 2021-06-01 VITALS — BODY MASS INDEX: 24.25 KG/M2 | HEIGHT: 66 IN | WEIGHT: 150.9 LBS

## 2021-06-01 VITALS — HEIGHT: 66 IN | BODY MASS INDEX: 24.11 KG/M2 | WEIGHT: 150 LBS

## 2021-06-02 VITALS — BODY MASS INDEX: 25.75 KG/M2 | WEIGHT: 154.75 LBS

## 2021-06-02 VITALS — HEIGHT: 66 IN | WEIGHT: 150 LBS | BODY MASS INDEX: 24.11 KG/M2

## 2021-06-04 VITALS
TEMPERATURE: 98.1 F | WEIGHT: 144 LBS | SYSTOLIC BLOOD PRESSURE: 110 MMHG | DIASTOLIC BLOOD PRESSURE: 71 MMHG | HEART RATE: 90 BPM | OXYGEN SATURATION: 97 % | BODY MASS INDEX: 23.96 KG/M2

## 2021-06-04 VITALS
SYSTOLIC BLOOD PRESSURE: 115 MMHG | TEMPERATURE: 98.1 F | DIASTOLIC BLOOD PRESSURE: 75 MMHG | BODY MASS INDEX: 24.06 KG/M2 | HEART RATE: 83 BPM | RESPIRATION RATE: 16 BRPM | WEIGHT: 144.6 LBS | OXYGEN SATURATION: 96 %

## 2021-06-04 VITALS
HEART RATE: 80 BPM | SYSTOLIC BLOOD PRESSURE: 112 MMHG | OXYGEN SATURATION: 98 % | BODY MASS INDEX: 23.73 KG/M2 | HEIGHT: 65 IN | TEMPERATURE: 98.3 F | WEIGHT: 142.44 LBS | RESPIRATION RATE: 16 BRPM | DIASTOLIC BLOOD PRESSURE: 76 MMHG

## 2021-06-04 NOTE — TELEPHONE ENCOUNTER
Spoke with , received a fax for a refill on albuterol, denied due to patient needing to be seen  Last time med was sent was 11/15/18 and LOV was 11/15/18    Sent a my chart message for patient to male an appointment

## 2021-06-05 VITALS — WEIGHT: 164 LBS | HEIGHT: 65 IN | BODY MASS INDEX: 27.32 KG/M2

## 2021-06-05 NOTE — PROGRESS NOTES
Assessment:     1. Sore throat  Rapid Strep A Screen-Throat    Group A Strep, RNA Direct Detection, Throat    Mononucleosis Screen          Plan:     Suspect viral pharyngitis.  Rapid strep screen and mono screen both negative.  Recommend symptomatic care and discussed the typical course of illness.  Follow-up if symptoms getting significantly worse or not continuing to improve over the next week.    Subjective:       24 y.o. female presents for evaluation of a sore throat that is been going on for about 1 to 1-1/2 weeks.  Feels like it is been getting worse over the past couple of days.  She is feeling very tired 3 days ago but this is gotten a bit better.  She has not had a fever.  She did start having a cough today but this is been new.  She denies any ear pain, shortness of breath, wheezing, headache, facial pain, abdominal pain, nausea, vomiting, or skin rash.  She has taken some Tylenol for her symptoms which seems to have helped somewhat.    Patient Active Problem List   Diagnosis     Amenorrhea     Intermittent asthma     Anxiety     Allergic rhinitis     Contraception - IUD     Skin macule     Pregnant       Past Medical History:   Diagnosis Date     Asthma     states mild       Past Surgical History:   Procedure Laterality Date     DILATION AND CURETTAGE OF UTERUS N/A 3/30/2018    Procedure: SUCTION DILATION AND CURETTAGE;  Surgeon: Jose Guadalupe Stephens MD;  Location: [facility];  Service:        Current Outpatient Medications on File Prior to Visit   Medication Sig Dispense Refill     albuterol (PROAIR HFA) 90 mcg/actuation inhaler Inhale 2 puffs every 4 (four) hours as needed for wheezing or shortness of breath. 1 Inhaler 1     budesonide-formoterol (SYMBICORT) 160-4.5 mcg/actuation inhaler Inhale 2 puffs 2 (two) times a day. 1 Inhaler 3     ibuprofen (ADVIL,MOTRIN) 600 MG tablet Take 1 tablet (600 mg total) by mouth every 6 (six) hours as needed for pain. 30 tablet 0     loratadine (CLARITIN) 10 mg  tablet Take 10 mg by mouth daily as needed for allergies.        prenatal vitamin iron-folic acid 27mg-0.8mg (PRENATAL S) 27 mg iron- 800 mcg Tab tablet Take 1 tablet by mouth daily.       No current facility-administered medications on file prior to visit.        Allergies   Allergen Reactions     Red Dye Hives       Family History   Problem Relation Age of Onset     No Medical Problems Mother      No Medical Problems Father      Clotting disorder Sister         Pramod Soulier Syndrome       Social History     Socioeconomic History     Marital status:      Spouse name: Not on file     Number of children: Not on file     Years of education: Not on file     Highest education level: Not on file   Occupational History     Not on file   Social Needs     Financial resource strain: Not on file     Food insecurity:     Worry: Not on file     Inability: Not on file     Transportation needs:     Medical: Not on file     Non-medical: Not on file   Tobacco Use     Smoking status: Former Smoker     Smokeless tobacco: Never Used   Substance and Sexual Activity     Alcohol use: No     Drug use: No     Sexual activity: Yes     Partners: Male   Lifestyle     Physical activity:     Days per week: Not on file     Minutes per session: Not on file     Stress: Not on file   Relationships     Social connections:     Talks on phone: Not on file     Gets together: Not on file     Attends Zoroastrianism service: Not on file     Active member of club or organization: Not on file     Attends meetings of clubs or organizations: Not on file     Relationship status: Not on file     Intimate partner violence:     Fear of current or ex partner: Not on file     Emotionally abused: Not on file     Physically abused: Not on file     Forced sexual activity: Not on file   Other Topics Concern     Not on file   Social History Narrative    ** Merged History Encounter **              Review of Systems  A 12 point comprehensive review of systems was  negative except as noted.      Objective:                                  General Appearance:      Vitals:    01/11/20 0812   BP: 115/75   Pulse: 83   Resp: 16   Temp: 98.1  F (36.7  C)   SpO2: 96%           Alert, pleasant, cooperative, no distress, appears stated age   Head:    Normocephalic, without obvious abnormality, atraumatic   Eyes:    Conjunctiva/corneas clear   Ears:    Normal TM's without erythema or bulging. Normal external ear canals, both ears   Nose:   Nares normal, septum midline, mucosa normal, no drainage    or sinus tenderness   Throat:  Oropharynx notable for bilateral erythematous and enlarged tonsils with exudate present.   Neck:  With mildly tender anterior cervical of adenopathy noted.   Lungs:     Clear to auscultation bilaterally without wheezes, rales, or rhonchi, respirations unlabored    Heart:    Regular rate and rhythm, S1 and S2 normal, no murmur, rub or gallop       Extremities:   Extremities normal, atraumatic, no cyanosis or edema   Skin:   Skin color, texture, turgor normal, no rashes or lesions          Recent Results (from the past 24 hour(s))   Rapid Strep A Screen-Throat   Result Value Ref Range    Rapid Strep A Antigen No Group A Strep detected, presumptive negative No Group A Strep detected, presumptive negative   Mononucleosis Screen   Result Value Ref Range    Mono Screen Negative Negative               This note has been dictated using voice recognition software. Any grammatical or context distortions are unintentional and inherent to the software

## 2021-06-05 NOTE — PROGRESS NOTES
EPDS was done in clinic today during child's 6 month visit and score was 12 with negative psychosis and negative screen for suicidal ideation.   Wilma has history of depression treated with out medication.   Copy of EPDS was given to Wilma today as well as educational material about postpartum depression.     Plan:   Follow up this week with OB/primary care provider was recommended.  Behavioral health referral was not placed.   She is planning to schedule an appointment with 1 of the providers in our clinic (Cyndi Pritchett) as soon as possible.

## 2021-06-06 NOTE — PROGRESS NOTES
Chief Complaint   Patient presents with     Urticaria     24hrs       HPI:  Wilma Esteban is a 24 y.o. female who presents today complaining of hives all over her body x 24 hours.  She denies sick sxs, lip or tongue swelling. This happened to her once when she was a young child. She denies any new foods or meds. She tried taking a Claritin, but it didn't help.     History obtained from the patient.    Problem List:  2019-07: Pregnant  2017-10: Skin macule  2016-05: Contraception - IUD  2016-05: Allergic rhinitis  2016-05: Anxiety  2016-05: Allergy to cats  2015-04: Seasonal allergies  2015-04: Intermittent asthma  2015-01: Pregnancy  2015-01: Pregnant  Amenorrhea  Pregnancy      Past Medical History:   Diagnosis Date     Asthma     states mild       Social History     Tobacco Use     Smoking status: Former Smoker     Smokeless tobacco: Never Used   Substance Use Topics     Alcohol use: No       Review of Systems   Skin: Positive for rash.   All other systems reviewed and are negative.      Vitals:    02/25/20 1147   BP: 110/71   Patient Site: Right Arm   Patient Position: Sitting   Cuff Size: Adult Regular   Pulse: 90   Temp: 98.1  F (36.7  C)   TempSrc: Oral   SpO2: 97%   Weight: 144 lb (65.3 kg)       Physical Exam  Vitals signs and nursing note reviewed.   Constitutional:       General: She is not in acute distress.     Appearance: She is well-developed. She is not diaphoretic.   HENT:      Head: Normocephalic and atraumatic.      Right Ear: External ear normal.      Left Ear: External ear normal.   Eyes:      General:         Right eye: No discharge.         Left eye: No discharge.      Conjunctiva/sclera: Conjunctivae normal.   Pulmonary:      Effort: Pulmonary effort is normal. No respiratory distress.   Neurological:      Mental Status: She is alert.   Psychiatric:         Behavior: Behavior normal.         Thought Content: Thought content normal.         Judgment: Judgment normal.         Clinical  Decision Making:  Patient experiencing acute urticaria with no known cause.  Recommend symptomatic treatment with high-dose Zyrtec.  This has been researched to be safe and severe urticarial cases.  No signs of anaphylaxis or infection present.  At the end of the encounter, I discussed results, diagnosis, medications. Discussed red flags for immediate return to clinic/ER, as well as indications for follow up if no improvement. Patient understood and agreed to plan. Patient was stable for discharge.    1. Hives  cetirizine (ZYRTEC) 10 MG tablet         Patient Instructions   1. In children often times the cause of hives is viral or unknown.    2. Recommend symptomatic treatment with: Zyrtec 10 mg every6 hours as needed for hives/itching.  Sedation is common with the use of this medication.  3.Follow up with primary care provider next week if symptoms persist, sooner if worsening symptoms.   4. Seek emergency medical attention if there are signs of worsening reaction: lip/tongue/throat swelling, vomiting, facial swelling, or difficulty breathing

## 2021-06-06 NOTE — TELEPHONE ENCOUNTER
Medication Question or Clarification  Who is calling: patient   What medication are you calling about (include dose and sig)?:   budesonide-formoteroL (SYMBICORT) 160-4.5 mcg/actuation inhaler  1 Inhaler  11  3/16/2020      Sig - Route: Inhale 2 puffs 2 (two) times a day. - Inhalation    Who prescribed the medication?: Prince Ramesh   What is your question/concern?: Patient states her insurance for budesonide formoterol is not covered by her insurance patient is requesting for alternative medication  . Please advise .  Requested Pharmacy: [facility] # [id number]  Okay to leave a detailed message?: No

## 2021-06-06 NOTE — PATIENT INSTRUCTIONS - HE
1. In children often times the cause of hives is viral or unknown.    2. Recommend symptomatic treatment with: Zyrtec 10 mg every6 hours as needed for hives/itching.  Sedation is common with the use of this medication.  3.Follow up with primary care provider next week if symptoms persist, sooner if worsening symptoms.   4. Seek emergency medical attention if there are signs of worsening reaction: lip/tongue/throat swelling, vomiting, facial swelling, or difficulty breathing

## 2021-06-13 NOTE — PROGRESS NOTES
Subjective:      Wilma Mitchell is a 21 y.o. female who presents for evaluation of spot on skin.  She has had this spot since June or July of this year.  She is not sure if it is getting larger or not.  It is not itching.  Not painful.  She has not noticed any dry flaky skin around the lesion.  No obvious trauma or injury that could account for it.  She thinks it is probably nothing to worry about, but she wanted to have an examination.    Patient Active Problem List   Diagnosis     Amenorrhea     Intermittent asthma     Anxiety     Allergic rhinitis     Contraception - IUD       Current Outpatient Prescriptions:      albuterol (PROAIR HFA) 90 mcg/actuation inhaler, Inhale 2 puffs every 4 (four) hours as needed for wheezing or shortness of breath., Disp: 1 Inhaler, Rfl: 1     fluticasone (FLONASE) 50 mcg/actuation nasal spray, 1 spray each nostril twice daily, Disp: 1 g, Rfl: 3     levonorgestrel (MIRENA) 20 mcg/24 hr (5 years) IUD, Mirena IUD, Disp: 1 each, Rfl: 0     loratadine (CLARITIN) 10 mg tablet, Take 10 mg by mouth daily., Disp: , Rfl:      Objective:     Allergies:  Review of patient's allergies indicates no known allergies.    Vitals:  Vitals:    10/26/17 0839   BP: 110/66   Pulse: 64   Resp: 20   Temp: 97.7  F (36.5  C)     Body mass index is 24.21 kg/(m^2).    Vital signs reviewed.  General: Patient is alert and oriented x 3, in no apparent distress  Skin: Small hyperpigmented macule present on the chest wall between the breasts oval-shaped, 2 cm long and 1 cm wide, well-defined borders, no central clearing, no raised edges, no dry flaky skin, no other lesions noted    Assessment and Plan:   1.  Hyperpigmented macule.  This is most likely benign lesion.  Its location makes it unlikely to be from sun damage.  No family history of skin cancer.  No other concerning lesions.  I offered patient a referral to dermatology and she declines.  She could try symptomatic treatment with hydrocortisone or  antifungal.  Follow-up as needed, or if things change.    This dictation uses voice recognition software, which may contain typographical errors.

## 2021-06-14 NOTE — PROGRESS NOTES
"Resident at bedside, discussed plan to discharge to home, pt has denied any regular contractions, \"radha hines\" occasionally and very mild per pt.  Discharging to home, instructions, reviewed with pt and spouse. Home ambulatory with copy of instructions.  "

## 2021-06-14 NOTE — PROGRESS NOTES
"[facility] OB Triage    Subjective: Wilma Esteban is a  25 y.o. female at 35w6d with a current prenatal history significant for gestational DM who presents to OB triage with lower abdominal pain after falling down 4-5 stairs. She was heading down her stairs to see her other two children and help them with [place] morning presents when she fell down the stairs. She tried to catch herself by grabbing the railing. She did not fall on her belly. She is having left lower abdominal/flank pain and pain along her lower belly, which is in a similar location as her round ligament pain. She has not had any contractions, aside from her normal radha-hines, and denies bleeding, leaking of fluids, or regular contractions. She is feeling baby move normally. Patient reports backache, no bleeding, no contractions, no cramping and no leaking.   Fetal Movement: normal.    Estimated Date of Delivery: 21 Patient's last menstrual period was 2020 (exact date).  OB provider: Cynthia Pelletier PA-C  OB History        5    Para   2    Term   2       0    AB   2    Living   1       SAB   2    TAB   0    Ectopic   0    Multiple        Live Births   1                 Objective:   Temperature 98.8  F (37.1  C), temperature source Oral, resp. rate 18, height 5' 5\" (1.651 m), weight 164 lb (74.4 kg), last menstrual period 2020, not currently breastfeeding.  General:   alert, appears stated age and cooperative   Skin:   normal   HEENT:  extra ocular movement intact   Lungs:   Breathing comfortably on room air   Heart:   Appears well perfused   Extremities: Warm, dry.   Abdomen: FHT present   Membranes intact   Vadito:  None   FHT: Baseline: 140 bpm, Variability: Moderate (6 - 25 bpm), Accelerations: Present and Decelerations: Absent   Presentation: unsure   Cervix: Not examined      Laboratory Studies:   Results for orders placed or performed in visit on 20   Rapid Strep A " Screen-Throat    Specimen: Throat   Result Value Ref Range    Rapid Strep A Antigen No Group A Strep detected, presumptive negative No Group A Strep detected, presumptive negative   Group A Strep, RNA Direct Detection, Throat    Specimen: Throat   Result Value Ref Range    Group A Strep by PCR No Group A Strep rRNA detected No Group A Strep rRNA detected   Mononucleosis Screen   Result Value Ref Range    Mono Screen Negative Negative        ASSESSMENT AND PLAN: Wilma Esteban is a  25 y.o. female who presented to [facility] at 35w6d on 2020 with concerns after falling down 4-5 stairs this morning at 0800. She was monitored for 4 hours and was without signs of fetal distress/contractions.  1. Not in labor  2. Watched patient for 4 hours and she was without contractions or signs of fetal distress (category 1 tracing)  3. Return to ED if having regular contractions, water breaks, or vaginal bleeding occurs  4. Rh positive so RhoGam not indicated  5. Acetaminophen prn for arm pain  6. Follow up with regular OB provider in the next week    Patient discussed with attending physician, Dr.Kathryn Mcgee, who agrees with the plan.      Paola Reynolds MD PGY3 2020  [facility]

## 2021-06-14 NOTE — PROGRESS NOTES
Pt and spouse here at [facility] for evaluation of pain after falling this am, she fell down about 4-5 stairs around 0800 today, describes catching herself with her shoulder and identities her lower right back as being tender.  Pt denies that she hit her abdomen but is feeling some dull ache lower abdomen on her left side, along with occasional cramps.  EFM in place, will update provider.

## 2021-06-14 NOTE — PROGRESS NOTES
"Pt is resting, denies regular contractions, admits to occasional \"radha hines\" contractions.  Plan is to monitor until 1415, then likely discharge to home.  "

## 2021-06-16 PROBLEM — L98.8 SKIN MACULE: Status: ACTIVE | Noted: 2017-10-26

## 2021-06-16 PROBLEM — Z34.90 PREGNANT: Status: ACTIVE | Noted: 2019-07-26

## 2021-06-17 NOTE — ANESTHESIA PREPROCEDURE EVALUATION
Anesthesia Evaluation      Patient summary reviewed   No history of anesthetic complications     Airway   Mallampati: II  Neck ROM: full   Pulmonary - negative ROS and normal exam                          Cardiovascular - negative ROS and normal exam   Neuro/Psych - negative ROS     Endo/Other - negative ROS      GI/Hepatic/Renal - negative ROS           Dental                         Anesthesia Plan  Planned anesthetic: MAC    ASA 2   Induction: intravenous   Anesthetic plan and risks discussed with: patient

## 2021-06-17 NOTE — ANESTHESIA POSTPROCEDURE EVALUATION
Patient: Wilma Esteban  SUCTION DILATION AND CURETTAGE  Anesthesia type: MAC    Patient location: PACU  Last vitals:   Vitals:    03/30/18 0950   BP:    Pulse:    Resp:    Temp: 37.1  C (98.8  F)   SpO2:      Post vital signs: stable  Level of consciousness: awake and responds to simple questions  Post-anesthesia pain: pain controlled  Post-anesthesia nausea and vomiting: no  Pulmonary: unassisted, return to baseline  Cardiovascular: stable and blood pressure at baseline  Hydration: adequate  Anesthetic events: no    QCDR Measures:  ASA# 11 - Shaila-op Cardiac Arrest: ASA11B - Patient did NOT experience unanticipated cardiac arrest  ASA# 12 - Shaila-op Mortality Rate: ASA12B - Patient did NOT die  ASA# 13 - PACU Re-Intubation Rate: ASA13B - Patient did NOT require a new airway mgmt  ASA# 10 - Composite Anes Safety: ASA10A - No serious adverse event    Additional Notes:

## 2021-06-17 NOTE — ANESTHESIA CARE TRANSFER NOTE
Last vitals:   Vitals:    03/30/18 0905   BP: 117/63   Pulse: 94   Resp: 16   Temp: 36.4  C (97.6  F)   SpO2: 98%     Patient's level of consciousness is awake  Spontaneous respirations: yes  Maintains airway independently: yes  Dentition unchanged: yes  Oropharynx: oropharynx clear of all foreign objects    QCDR Measures:  ASA# 20 - Surgical Safety Checklist: WHO surgical safety checklist completed prior to induction  PQRS# 430 - Adult PONV Prevention: 4558F - Pt received => 2 anti-emetic agents (different classes) preop & intraop  ASA# 8 - Peds PONV Prevention: NA - Not pediatric patient, not GA or 2 or more risk factors NOT present  PQRS# 424 - Shaila-op Temp Management: 4559F - At least one body temp DOCUMENTED => 35.5C or 95.9F within required timeframe  PQRS# 426 - PACU Transfer Protocol: - Transfer of care checklist used  ASA# 14 - Acute Post-op Pain: ASA14B - Patient did NOT experience pain >= 7 out of 10

## 2021-06-17 NOTE — PATIENT INSTRUCTIONS - HE
Patient Instructions by Kelli Diaz MD at 1/11/2020  8:10 AM     Author: Kelli Diaz MD Service: -- Author Type: Physician    Filed: 1/11/2020  9:03 AM Encounter Date: 1/11/2020 Status: Addendum    : Kelli Diaz MD (Physician)    Related Notes: Original Note by Kelli Diaz MD (Physician) filed at 1/11/2020  9:03 AM       You do not have strep or mono.  I suspect this is likely sore throat due to a viral illness and that your symptoms should resolve on their own with time.  I would not recommend antibiotics at this time.  Follow-up if symptoms are getting worse or not improving.      Patient Education     Self-Care for Sore Throats    Sore throats happen for many reasons, such as colds, allergies, cigarette smoke, air pollution, and infections caused by viruses or bacteria. In any case, your throat becomes red and sore. Your goal for self-care is to reduce your discomfort while giving your throat a chance to heal.  Moisten and soothe your throat  Tips include the following:    Try a sip of water first thing after waking up.    Keep your throat moist by drinking 6 or more glasses of clear liquids every day.    Run a cool-air humidifier in your room overnight.    Avoid cigarette smoke.     If air pollution gives you a sore throat, check the air quality index and, on high pollution days, try to limit outdoor time.    Suck on throat lozenges, cough drops, hard candy, ice chips, or frozen fruit-juice bars. Use the sugar-free versions if your diet or medical condition requires them.  Gargle to ease irritation  Gargling every hour or 2 can ease irritation. Try gargling with 1 of these solutions:    1/4 teaspoon of salt in 1/2 cup of warm water    An over-the-counter anesthetic gargle  Use medicine for more relief  Over-the-counter medicine can reduce sore throat symptoms. Ask your pharmacist if you have questions about which medicine to use and, to prevent possible drug  interactions, be certain to let the pharmacist know what medications you take. To decrease symptoms:    Ease pain with anesthetic sprays. Aspirin or an aspirin substitute also helps. Remember, never give aspirin to anyone 18 or younger, or if you are already taking blood thinners.     For sore throats caused by allergies, try antihistamines to block the allergic reaction.    Remember: unless a sore throat is caused by a bacterial infection, antibiotics wont help you.  Prevent future sore throats  Prevention tips include the following:    Stop smoking or reduce contact with secondhand smoke. Smoke irritates the tender throat lining.    Limit contact with pets and with allergy-causing substances, such as pollen and mold.    When youre around someone with a sore throat or cold, wash your hands often to keep viruses or bacteria from spreading.    Limit outdoor time when air pollution particulates are high    Dont strain your vocal cords.  Contact your healthcare provider if you have:    A temperature over 101 F (38.3 C)    White spots on the throat    Great difficulty swallowing    Trouble breathing    A skin rash    Recent exposure to someone else with strep bacteria    Severe hoarseness and swollen glands in the neck or jaw  Date Last Reviewed: 8/1/2016 2000-2019 [employer]. [address] [place] [address], [place] [address]. All rights reserved. This information is not intended as a substitute for professional medical care. Always follow your healthcare professional's instructions.

## 2021-06-18 NOTE — PROGRESS NOTES
"Chief complaint: Follow-up allergies    History of present illness: This is a pleasant 22-year-old woman who is here today for follow-up of her allergies.  She has a known history of animal allergy.  She did obtain a cat.  Since bring the cat home, she has had some worsening symptoms and would like to consider allergen immunotherapy.  She does have a history of asthma.  She reports she has been having to use her albuterol inhaler more frequently.  She continues on a daily antihistamine.  Nasal sprays and montelukast have not worked well for her in the past.  ACT score today is 15.  She reports she is waking up at night short of breath on occasion.    Past medical history, social history, family medical history, meds and allergies reviewed and updated accordingly.    Review of Systems performed as above and the remainder is negative.      Current Outpatient Prescriptions:      acetaminophen (TYLENOL) 500 MG tablet, Take 500 mg by mouth every 6 (six) hours as needed for pain., Disp: , Rfl:      albuterol (PROAIR HFA) 90 mcg/actuation inhaler, Inhale 2 puffs every 4 (four) hours as needed for wheezing or shortness of breath., Disp: 1 Inhaler, Rfl: 1     NORCO 5-325 mg per tablet, Take 1 tablet by mouth every 4 (four) hours as needed for pain., Disp: 20 tablet, Rfl: 0     loratadine (CLARITIN) 10 mg tablet, Take 10 mg by mouth daily as needed for allergies. , Disp: , Rfl:      mometasone-formoterol (DULERA) 200-5 mcg/actuation HFAA inhaler, Inhale 2 puffs 2 (two) times a day., Disp: 1 Inhaler, Rfl: 1    Allergies   Allergen Reactions     Red Dye Hives       /70  Ht 5' 6\" (1.676 m)  Wt 150 lb (68 kg)  BMI 24.21 kg/m2  Gen: Pleasant female not in acute distress  HEENT: Eyes no erythema of the bulbar or palpebral conjunctiva, no edema. Ears: TMs well visualized, no effusions. Nose: No congestion, mucosa normal. Mouth: Throat clear, no lip or tongue edema.   Cardiac: Regular rate and rhythm, no murmurs, rubs or " gallops  Respiratory: Clear to auscultation bilaterally, no adventitious breath sounds    Skin: No rashes or lesions  Psych: Alert and oriented times 3    Last Percutaneous Allergy Test Results  Trees  Erick, White  1:20 H  (W/F in mm): 0-0 (05/17/18 0753)  Birch Mix 1:20 H (W/F in mm): 0-0 (05/17/18 0753)  [place], Common 1:20 H (W/F in mm): 0-0 (05/17/18 0753)  Elm, American 1:20 H (W/F in mm): 0-0 (05/17/18 0753)  [place], Shagbark 1:20 H (W/F in mm): 0-0 (05/17/18 0753)  Maple, Hard/Sugar 1:20 H (W/F in mm): 0-0 (05/17/18 0753)  [place] Mix 1:20 H (W/F in mm): 0-0 (05/17/18 0753)  Oak, Red 1:20 H (W/F in mm): 0-0 (05/17/18 0753)  [place], American 1:20 H (W/F in mm): 0-0 (05/17/18 0753)  Howard Tree 1:20 H (W/F in mm): 0-0 (05/17/18 0753)  Dust Mites  D. Pteronyssinus Mite 30,000 AU/ML H (W/F in mm): 0-0 (05/17/18 0753)  D. Farinae Mite 30,000 AU/ML H (W/F in mm: 0-0 (05/17/18 0753)  Grasses  Grass Mix #4 10,000 BAU/ML H: 5-25 (05/17/18 0753)  Martín Grass 1:20 H (W/F in mm): 0-0 (05/17/18 0753)  Cockroach  Cockroach Mix 1:10 H (W/F in mm): 0-0 (05/17/18 0753)  Molds/Fungi  Alternaria Tenuis 1:10 H (W/F in mm): 0-0 (05/17/18 0753)  Aspergillus Fumigatus 1:10 H (W/F in mm): 0-0 (05/17/18 0753)  Homodendrum Cladosporioides 1:10 H (W/F in mm): 0-0 (05/17/18 0753)  Penicillin Notatum 1:10 H (W/F in mm): 0-0 (05/17/18 0753)  Epicoccum 1:10 H (W/F in mm): 0-0 (05/17/18 0753)  Weeds  Dock, Malcolm 1:20 H (W/F in mm): 0-0 (05/17/18 0753)  Lamb's Quarter 1:20 H (W/F in mm): 0-0 (05/17/18 0753)  Pigweed, Rough Red Root 1:20 H  (W/F in mm): 0-0 (05/17/18 0753)  Plantain, English 1:20 H  (W/F in mm): 0-0 (05/17/18 0753)  Sagebrush, Mugwort 1:20 H  (W/F in mm): 0-0 (05/17/18 0753)  Controls  Device Type: QUINTIP (05/17/18 0753)  Neg. control: 50% Glycerine/Saline H (W/F in mm): 0-0 (05/17/18 0753)  Pos. control: Histamine 6mg/ML (W/F in mms): 4-10 (05/17/18 0753)    Spirometry was performed.  FEV1 to FVC ratio 69%.  FEV1  2.77 L or 80% of predicted.  FVC 4.00 L or 99% predicted.    Impression report and plan:    1.  Mild persistent asthma  2.  Allergic rhinitis    I went over environmental control.  I would like her to start Dulera 200/5 2 puffs twice daily.  Follow in a month and repeat spirometry at that time.  We can discuss allergy shots in more detail next month if symptoms have improved.  Continue her daily antihistamine.  Goal for albuterol use is less than a few times per week outside exercise.

## 2021-06-19 NOTE — LETTER
Letter by Cynthia Pelletier PA-C at      Author: Cynthia Pelletier PA-C Service: -- Author Type: --    Filed:  Encounter Date: 4/2/2019 Status: (Other)         April 2, 2019     Patient: Wilma Esteban   YOB: 1995   Date of Visit: 4/2/2019       To Whom it May Concern:    Wilma Esteban was seen in my clinic on 4/2/2019.  Please excuse her from work today.    If you have any questions or concerns, please don't hesitate to call.    Sincerely,         Electronically signed by Cynthia Pelletier PA-C

## 2021-06-20 NOTE — LETTER
Letter by Kimo Fofana DO at      Author: Kimo Fofana DO Service: -- Author Type: --    Filed:  Encounter Date: 3/16/2020 Status: (Other)       My Asthma Action Plan    Name: Wilma Esteban   YOB: 1995  Date: 3/16/2020   My doctor: Kimo Morrison DO   My clinic: [facility], [place], operated by [facility]        My Control Medicine: Budesonide + formoterol (Symbicort HFA) -  160/4.5 mcg Twice daily  My Rescue Medicine: Albuterol (Proair/Ventolin/Proventil HFA) 2-4 puffs EVERY 4 HOURS as needed. Use a spacer if recommended by your provider.   My Oral Steroid Medicine: N/A My Asthma Severity:   Mild Persistent  Know your asthma triggers: animal dander and Allergies               GREEN ZONE   Good Control    I feel good    No cough or wheeze    Can work, sleep and play without asthma symptoms     Take your asthma control medicine every day.     1. If exercise triggers your asthma, take your rescue medication    15 minutes before exercise or sports, and    During exercise if you have asthma symptoms  2. Spacer to use with inhaler: If you have a spacer, make sure to use it with your inhaler             YELLOW ZONE Getting Worse  I have ANY of these:    I do not feel good    Cough or wheeze    Chest feels tight    Wake up at night 1. Keep taking your Green Zone medications  2. Start taking your rescue medicine:    every 20 minutes for up to 1 hour. Then every 4 hours for 24-48 hours.  3. If you stay in the Yellow Zone for more than 12-24 hours, contact your doctor.  4. If you do not return to the Green Zone in 12-24 hours or you get worse, start taking your oral steroid medicine if prescribed by your provider.           RED ZONE Medical Alert - Get Help  I have ANY of these:    I feel awful    Medicine is not helping    Breathing getting harder    Trouble walking or talking    Nose opens wide to breathe     1. Take your rescue medicine NOW  2. If your provider has  prescribed an oral steroid medicine, start taking it NOW  3. Call your doctor NOW  4. If you are still in the Red Zone after 20 minutes and you have not reached your doctor:    Take your rescue medicine again and    Call 911 or go to the emergency room right away    See your regular doctor within 2 weeks of an Emergency Room or Urgent Care visit for follow-up treatment.          Annual Reminders:  Meet with Asthma Educator,  Flu Shot in the Fall, consider Pneumonia Vaccination for patients with asthma (aged 19 and older).    Pharmacy:   [facility] #[id number] [address]  [address] [facility] [address]  Phone: [phone] Fax: [phone]      Electronically signed by Kimo Morrison,    Date: 03/16/20                    Asthma Triggers  How To Control Things That Make Your Asthma Worse    Triggers are things that make your asthma worse.  Look at the list below to help you find your triggers and what you can do about them.  You can help prevent asthma flare-ups by staying away from your triggers.      Trigger                                                          What you can do   Cigarette Smoke  Tobacco smoke can make asthma worse. Do not allow smoking in your home, car or around you.  Be sure no one smokes at a wong day care or school.  If you smoke, ask your health care provider for ways to help you quit.  Ask family members to quit too.  Ask your health care provider for a referral to Quit Plan to help you quit smoking, or call 0-371-362-PLAN.     Colds, Flu, Bronchitis  These are common triggers of asthma. Wash your hands often.  Dont touch your eyes, nose or mouth.  Get a flu shot every year.     Dust Mites  These are tiny bugs that live in cloth or carpet. They are too small to see. Wash sheets and blankets in hot water every week.   Encase pillows and mattress in dust mite proof covers.  Avoid having carpet if you can. If you have  carpet, vacuum weekly.   Use a dust mask and HEPA vacuum.   Pollen and Outdoor Mold  Some people are allergic to trees, grass, or weed pollen, or molds. Try to keep your windows closed.  Limit time out doors when pollen count is high.   Ask you health care provider about taking medicine during allergy season.     Animal Dander  Some people are allergic to skin flakes, urine or saliva from pets with fur or feathers. Keep pets with fur or feathers out of your home.    If you cant keep the pet outdoors, then keep the pet out of your bedroom.  Keep the bedroom door closed.  Keep pets off cloth furniture and away from stuffed toys.     Mice, Rats, and Cockroaches   Some people are allergic to the waste from these pests.   Cover food and garbage.  Clean up spills and food crumbs.  Store grease in the refrigerator.   Keep food out of the bedroom.   Indoor Mold  This can be a trigger if your home has high moisture. Fix leaking faucets, pipes, or other sources of water.   Clean moldy surfaces.  Dehumidify basement if it is damp and smelly.   Smoke, Strong Odors, and Sprays  These can reduce air quality. Stay away from strong odors and sprays, such as perfume, powder, hair spray, paints, smoke incense, paint, cleaning products, candles and new carpet.   Exercise or Sports  Some people with asthma have this trigger. Be active!  Ask your doctor about taking medicine before sports or exercise to prevent symptoms.    Warm up for 5-10 minutes before and after sports or exercise.     Other Triggers of Asthma  Cold air:  Cover your nose and mouth with a scarf.  Sometimes laughing or crying can be a trigger.  Some medicines and food can trigger asthma.

## 2021-06-21 NOTE — PROGRESS NOTES
"Chief complaint: Asthma    History of present illness: This is a pleasant 22-year-old woman I saw back in May.  She has a history of animal allergy as well as asthma.  I started her on Dulera at that time.  However, she failed to take it regularly into the last 1-2 months.  She reports she has recently found out she is about 4 weeks pregnant.  She had 2 miscarriages previously and she is very worried about controlling her asthma during her pregnancy.  She states she is been having some shortness of breath and chest tightness as well as wheezing.  For this reason, she restarted the Dulera which she believes is helping.  She has been using her albuterol inhaler more frequently as well.  She stopped using Flonase as she read that it was not approved with pregnancy.  She has had some increased nasal congestion.  She has now obtained another.  She has 2 at home.  They do seem to trigger her symptoms.    Past medical history, social history, family medical history, meds and allergies reviewed and updated accordingly.      Review of Systems performed as above and the remainder is negative.         Current Outpatient Medications:      acetaminophen (TYLENOL) 500 MG tablet, Take 500 mg by mouth every 6 (six) hours as needed for pain., Disp: , Rfl:      albuterol (PROAIR HFA) 90 mcg/actuation inhaler, Inhale 2 puffs every 4 (four) hours as needed for wheezing or shortness of breath., Disp: 1 Inhaler, Rfl: 1     loratadine (CLARITIN) 10 mg tablet, Take 10 mg by mouth daily as needed for allergies. , Disp: , Rfl:      mometasone-formoterol (DULERA) 200-5 mcg/actuation HFAA inhaler, Inhale 2 puffs 2 (two) times a day., Disp: 1 Inhaler, Rfl: 4     NORCO 5-325 mg per tablet, Take 1 tablet by mouth every 4 (four) hours as needed for pain., Disp: 20 tablet, Rfl: 0    Allergies   Allergen Reactions     Red Dye Hives       Pulse 72   Ht 5' 6\" (1.676 m)   Wt 150 lb (68 kg)   LMP 01/25/2018 (Exact Date) Comment: pregnant  BMI 24.21 " kg/m    Gen: Pleasant female not in acute distress  HEENT: Eyes no erythema of the bulbar or palpebral conjunctiva, no edema. Ears: TMs well visualized, no effusions. Nose: No congestion, mucosa normal. Mouth: Throat clear, no lip or tongue edema.   Cardiac: Regular rate and rhythm, no murmurs, rubs or gallops  Respiratory: Clear to auscultation bilaterally, no adventitious breath sounds  Lymph: No supraclavicular or cervical lymphadenopathy  Skin: No rashes or lesions  Psych: Alert and oriented times 3    Spirometry was performed.  FEV1 to FVC ratio 80%.  FEV1 3.58 L or 106% of predicted.  FVC 4.48 L 115% of predicted.    FENO: 106    Impression report and plan:    1.  Mild persistent asthma  2.  Allergic rhinitis    I went over environmental control regarding the cat.  Refilled her Dulera 200/5 2 puffs twice daily.  Explained the importance of taking her controller medication regularly.  I did state that women can worsen with her symptoms during pregnancy.  I would like her to follow in 6 months with a repeat breathing test at that time.  I also refilled her albuterol inhaler.  Reviewed technique for both inhalers.  For her nasal congestion, she could start Rhinocort if she so desired.

## 2021-06-23 NOTE — TELEPHONE ENCOUNTER
Fax received from [facility], they have started the Prior Authorization Process via Cover My Meds    CoverMyMeds Key: [id number]    Medication Name: Dulera    Insurance Plan:   PBM: [employer]  Patient ID: [id number]    Please complete the PA process or change to covered alternative

## 2021-10-10 ENCOUNTER — HEALTH MAINTENANCE LETTER (OUTPATIENT)
Age: 26
End: 2021-10-10

## 2021-12-11 ENCOUNTER — APPOINTMENT (OUTPATIENT)
Dept: URGENT CARE | Facility: CLINIC | Age: 26
End: 2021-12-11
Payer: COMMERCIAL

## 2022-05-21 ENCOUNTER — HEALTH MAINTENANCE LETTER (OUTPATIENT)
Age: 27
End: 2022-05-21

## 2022-09-18 ENCOUNTER — HEALTH MAINTENANCE LETTER (OUTPATIENT)
Age: 27
End: 2022-09-18

## 2023-06-04 ENCOUNTER — HEALTH MAINTENANCE LETTER (OUTPATIENT)
Age: 28
End: 2023-06-04

## 2024-07-14 ENCOUNTER — HEALTH MAINTENANCE LETTER (OUTPATIENT)
Age: 29
End: 2024-07-14

## 2025-06-17 NOTE — PROGRESS NOTES
Assessment / Impression     1. Mild persistent asthma without complication  budesonide-formoteroL (SYMBICORT) 160-4.5 mcg/actuation inhaler    albuterol (PROAIR HFA) 90 mcg/actuation inhaler   2. Allergic rhinitis, unspecified seasonality, unspecified trigger     3. Cat allergies     4. Allergic reaction, subsequent encounter           Plan:     Her asthma symptoms are stable.  I recommended she restart Symbicort and have albuterol available as needed as we are in the midst of the COVID-19 pandemic.  She is going to get the influenza vaccine today.  She will continue to avoid known allergens which exacerbate asthma symptoms.  He may continue loratadine as needed.  The underlying cause of the urticaria symptoms she developed last month and thought medical attention for the emergency department are unknown.  She thinks this could have been due to something she ate.  It is reassuring that symptoms have resolved.    Subjective:      HPI: Wilma Etseban is a 24 y.o. female who presents to the clinic for follow-up appointment.  She has a history significant for mild persistent asthma and allergies.  She has not been taking Symbicort regularly.  She recently did some housecleaning as they are moving which stirred up quite a bit of dust and cat dander.  She admits that this has made her asthma symptoms feel worse.  She would like to restart Symbicort, especially as we are in the midst of the COVID-19 pandemic.  She and her  will be working from home over the next few weeks.  Her son will not be going to  either.  She has Claritin available as needed.        Review of Systems  All other systems reviewed and are negative.     Social History     Tobacco Use   Smoking Status Former Smoker   Smokeless Tobacco Never Used       Family History   Problem Relation Age of Onset     No Medical Problems Mother      No Medical Problems Father      Clotting disorder Sister         Pramod Soulier Syndrome       Objective:  "    /76 (Patient Site: Left Arm, Patient Position: Sitting, Cuff Size: Adult Regular)   Pulse 80   Temp 98.3  F (36.8  C) (Oral)   Resp 16   Ht 5' 5\" (1.651 m)   Wt 142 lb 7 oz (64.6 kg)   LMP 02/24/2020 (Exact Date)   SpO2 98%   Breastfeeding No   BMI 23.70 kg/m    Physical Examination: General appearance - alert, well appearing, and in no distress  Eyes: pupils equal and reactive, extraocular eye movements intact  Mouth: mucous membranes moist, pharynx normal without lesions  Neck: supple, no significant adenopathy  Lungs: clear to auscultation, no wheezes, rales or rhonchi, symmetric air entry  Heart: normal rate, regular rhythm, normal S1, S2, no murmurs, rubs, clicks or gallops  Neurological: alert, oriented, normal speech, no focal findings or movement disorder noted.    Extremities: No edema, no clubbing or cyanosis  Psychiatric: Normal affect. Does not appear anxious or depressed.    No results found for this or any previous visit (from the past 168 hour(s)).    Current Outpatient Medications   Medication Sig     albuterol (PROAIR HFA) 90 mcg/actuation inhaler Inhale 2 puffs every 4 (four) hours as needed for wheezing or shortness of breath.     budesonide-formoteroL (SYMBICORT) 160-4.5 mcg/actuation inhaler Inhale 2 puffs 2 (two) times a day.     EPINEPHrine (EPIPEN/ADRENACLICK/AUVI-Q) 0.3 mg/0.3 mL injection      ibuprofen (ADVIL,MOTRIN) 600 MG tablet Take 1 tablet (600 mg total) by mouth every 6 (six) hours as needed for pain.     loratadine (CLARITIN) 10 mg tablet Take 10 mg by mouth daily as needed for allergies.      prenatal vitamin iron-folic acid 27mg-0.8mg (PRENATAL S) 27 mg iron- 800 mcg Tab tablet Take 1 tablet by mouth daily.     " CBC Full  -  ( 12 Jun 2025 05:35 )  WBC Count : 7.18 K/uL  RBC Count : 5.98 M/uL  Hemoglobin : 16.3 g/dL  Hematocrit : 48.5 %  Platelet Count - Automated : 117 K/uL  Mean Cell Volume : 81.1 fL  Mean Cell Hemoglobin : 27.3 pg  Mean Cell Hemoglobin Concentration : 33.6 g/dL  Auto Neutrophil # : x  Auto Lymphocyte # : x  Auto Monocyte # : x  Auto Eosinophil # : x  Auto Basophil # : x  Auto Neutrophil % : x  Auto Lymphocyte % : x  Auto Monocyte % : x  Auto Eosinophil % : x  Auto Basophil % : x  06-12    142  |  105  |  17  ----------------------------<  88  3.7   |  23  |  0.91    Ca    9.4      12 Jun 2025 05:35  Phos  3.1     06-12  Mg     2.20     06-12    TPro  6.8  /  Alb  x   /  TBili  x   /  DBili  x   /  AST  x   /  ALT  x   /  AlkPhos  x   06-12                         15.9   6.98  )-----------( 208      ( 17 Jun 2025 10:00 )             48.3   06-17    144  |  110[H]  |  23  ----------------------------<  149[H]  3.9   |  23  |  0.91    Ca    8.9      17 Jun 2025 06:19  Phos  2.8     06-17  Mg     2.10     06-17                             14.6   6.89  )-----------( 171      ( 10 Hayder 2025 06:00 )             43.9   06-10    143  |  106  |  21  ----------------------------<  108[H]  3.5   |  24  |  0.96    Ca    9.3      10 Hayder 2025 06:00  Phos  2.3     06-10  Mg     2.00     06-10    TPro  7.8  /  Alb  4.7  /  TBili  0.8  /  DBili  x   /  AST  32  /  ALT  34  /  AlkPhos  118  06-09   CBC Full  -  ( 12 Jun 2025 05:35 )  WBC Count : 7.18 K/uL  RBC Count : 5.98 M/uL  Hemoglobin : 16.3 g/dL  Hematocrit : 48.5 %  Platelet Count - Automated : 117 K/uL  Mean Cell Volume : 81.1 fL  Mean Cell Hemoglobin : 27.3 pg  Mean Cell Hemoglobin Concentration : 33.6 g/dL  Auto Neutrophil # : x  Auto Lymphocyte # : x  Auto Monocyte # : x  Auto Eosinophil # : x  Auto Basophil # : x  Auto Neutrophil % : x  Auto Lymphocyte % : x  Auto Monocyte % : x  Auto Eosinophil % : x  Auto Basophil % : x  06-13    143  |  105  |  31[H]  ----------------------------<  83  3.9   |  21[L]  |  1.25    Ca    9.7      13 Jun 2025 06:12  Phos  3.8     06-13  Mg     2.20     06-13    TPro  6.8  /  Alb  x   /  TBili  x   /  DBili  x   /  AST  x   /  ALT  x   /  AlkPhos  x   06-12